# Patient Record
Sex: FEMALE | Race: WHITE | NOT HISPANIC OR LATINO | Employment: PART TIME | URBAN - METROPOLITAN AREA
[De-identification: names, ages, dates, MRNs, and addresses within clinical notes are randomized per-mention and may not be internally consistent; named-entity substitution may affect disease eponyms.]

---

## 2017-04-04 ENCOUNTER — OFFICE VISIT (OUTPATIENT)
Dept: UROLOGY | Facility: CLINIC | Age: 47
End: 2017-04-04
Payer: COMMERCIAL

## 2017-04-04 VITALS
BODY MASS INDEX: 17.33 KG/M2 | WEIGHT: 107.81 LBS | HEIGHT: 66 IN | SYSTOLIC BLOOD PRESSURE: 94 MMHG | DIASTOLIC BLOOD PRESSURE: 61 MMHG | HEART RATE: 61 BPM

## 2017-04-04 DIAGNOSIS — N39.0 RECURRENT UTI: Primary | ICD-10-CM

## 2017-04-04 DIAGNOSIS — R31.0 GROSS HEMATURIA: ICD-10-CM

## 2017-04-04 PROCEDURE — 99204 OFFICE O/P NEW MOD 45 MIN: CPT | Mod: 25,S$GLB,, | Performed by: UROLOGY

## 2017-04-04 PROCEDURE — 99999 PR PBB SHADOW E&M-EST. PATIENT-LVL III: CPT | Mod: PBBFAC,,, | Performed by: UROLOGY

## 2017-04-04 PROCEDURE — 1160F RVW MEDS BY RX/DR IN RCRD: CPT | Mod: S$GLB,,, | Performed by: UROLOGY

## 2017-04-04 PROCEDURE — 81001 URINALYSIS AUTO W/SCOPE: CPT | Mod: S$GLB,,, | Performed by: UROLOGY

## 2017-04-04 RX ORDER — NITROFURANTOIN MACROCRYSTALS 50 MG/1
50 CAPSULE ORAL DAILY
Qty: 30 CAPSULE | Refills: 12 | Status: SHIPPED | OUTPATIENT
Start: 2017-04-04 | End: 2017-05-04

## 2017-04-04 NOTE — PROGRESS NOTES
"Subjective:       Patient ID: Shantell Aguilar is a 46 y.o. female.    Chief Complaint: Urinary Tract Infection (recurrent for past year)    HPI Comments: Shantell Aguilar is a 46 y.o. Female referred for "recurrent UTI" every couple of months for last year.   Symptoms of UTI - dysuria, frequency, gross hematuria.   Just finished cipro Sunday.    Still have periods.   She thinks it is related to intercourse. Her  lives in [place].  She is a . He is an  for a school system (equivanlent of head start).    Symptoms improved after antibiotics.     Urinary frequency baseline every 2 hours. Nocturia x 1.   No stress incontinence.   Small amount of incontinence without sensory awareness.     No constipation.  No previous UTI.   No history of kidney stones.     No smoking history.   No chemical exposure.   She is a vegetarian. She eats a lot of yogurt.        Urinary Tract Infection    Associated symptoms include frequency. Pertinent negatives include no chills or rash.       History reviewed. No pertinent surgical history.    Past Medical History:   Diagnosis Date    Abnormal Pap smear of cervix 2000    dysplasia - Conization?    Frequent UTI        Social History     Social History    Marital status: Single     Spouse name: N/A    Number of children: N/A    Years of education: N/A     Occupational History    Not on file.     Social History Main Topics    Smoking status: Never Smoker    Smokeless tobacco: Not on file    Alcohol use No    Drug use: No    Sexual activity: Yes     Partners: Male     Birth control/ protection: None     Other Topics Concern    Not on file     Social History Narrative       Family History   Problem Relation Age of Onset    Cancer Maternal Grandmother     Breast cancer Neg Hx     Ovarian cancer Neg Hx        No current outpatient prescriptions on file.     No current facility-administered medications for this visit.        Review of patient's " allergies indicates:   Allergen Reactions    Penicillins        Review of Systems   Constitutional: Negative for chills, fever and unexpected weight change.   HENT: Negative for nosebleeds.    Eyes: Negative for visual disturbance.   Respiratory: Negative for chest tightness.    Cardiovascular: Negative for chest pain.   Gastrointestinal: Negative for diarrhea.   Genitourinary: Positive for frequency.   Musculoskeletal: Negative for myalgias.   Skin: Negative for rash.   Neurological: Negative for seizures.   Hematological: Does not bruise/bleed easily.   Psychiatric/Behavioral: Negative for behavioral problems.       Objective:      Physical Exam   Vitals reviewed.  Constitutional: She is oriented to person, place, and time. She appears well-developed and well-nourished.   HENT:   Head: Normocephalic and atraumatic.   Eyes: No scleral icterus.   Cardiovascular: Normal rate and regular rhythm.    Pulmonary/Chest: Effort normal. No respiratory distress.   Abdominal: She exhibits no mass.   Musculoskeletal: She exhibits no tenderness.   Lymphadenopathy:     She has no cervical adenopathy.   Neurological: She is alert and oriented to person, place, and time.   Skin: Skin is warm and dry. No rash noted.     Psychiatric: She has a normal mood and affect.     urine dip clear.   Assessment:       1. Recurrent UTI    2. Gross hematuria        Plan:     The patient will be set up for a hematuria workup to include a CT urogram, urine cytology, cystoscopy and urine culture.  A BMP will be ordered if not done in the last 60 days.  The risks and benefits of cystoscopy were discussed with the patient.   Pelvic at time of cysto.   Labs screening health.   Release of records from urgent care.   Probiotic daily. D-mannose/cranberry.

## 2017-04-11 ENCOUNTER — HOSPITAL ENCOUNTER (OUTPATIENT)
Dept: RADIOLOGY | Facility: OTHER | Age: 47
Discharge: HOME | End: 2017-04-11
Attending: UROLOGY
Payer: COMMERCIAL

## 2017-04-11 ENCOUNTER — TELEPHONE (OUTPATIENT)
Dept: UROLOGY | Facility: CLINIC | Age: 47
End: 2017-04-11

## 2017-04-11 DIAGNOSIS — N12 PYELONEPHRITIS: Primary | ICD-10-CM

## 2017-04-11 DIAGNOSIS — N39.0 RECURRENT UTI: ICD-10-CM

## 2017-04-11 DIAGNOSIS — R31.0 GROSS HEMATURIA: ICD-10-CM

## 2017-04-11 PROCEDURE — 74178 CT ABD&PLV WO CNTR FLWD CNTR: CPT | Mod: TC

## 2017-04-11 PROCEDURE — 25500020 PHARM REV CODE 255: Performed by: UROLOGY

## 2017-04-11 PROCEDURE — 74178 CT ABD&PLV WO CNTR FLWD CNTR: CPT | Mod: 26,,, | Performed by: RADIOLOGY

## 2017-04-11 RX ORDER — SULFAMETHOXAZOLE AND TRIMETHOPRIM 800; 160 MG/1; MG/1
1 TABLET ORAL 2 TIMES DAILY
Qty: 28 TABLET | Refills: 0 | Status: SHIPPED | OUTPATIENT
Start: 2017-04-11 | End: 2017-04-25

## 2017-04-11 RX ADMIN — IOHEXOL 125 ML: 350 INJECTION, SOLUTION INTRAVENOUS at 02:04

## 2017-04-12 ENCOUNTER — PATIENT MESSAGE (OUTPATIENT)
Dept: UROLOGY | Facility: CLINIC | Age: 47
End: 2017-04-12

## 2018-03-07 ENCOUNTER — TELEPHONE (OUTPATIENT)
Dept: OBSTETRICS AND GYNECOLOGY | Facility: CLINIC | Age: 48
End: 2018-03-07

## 2018-03-07 ENCOUNTER — HOSPITAL ENCOUNTER (EMERGENCY)
Facility: OTHER | Age: 48
Discharge: HOME | End: 2018-03-07
Attending: EMERGENCY MEDICINE

## 2018-03-07 VITALS
HEART RATE: 69 BPM | DIASTOLIC BLOOD PRESSURE: 67 MMHG | RESPIRATION RATE: 18 BRPM | TEMPERATURE: 98 F | SYSTOLIC BLOOD PRESSURE: 121 MMHG | WEIGHT: 110 LBS | BODY MASS INDEX: 17.68 KG/M2 | HEIGHT: 66 IN

## 2018-03-07 DIAGNOSIS — R10.9 ABDOMINAL PAIN, UNSPECIFIED ABDOMINAL LOCATION: Primary | ICD-10-CM

## 2018-03-07 DIAGNOSIS — K59.00 CONSTIPATION, UNSPECIFIED CONSTIPATION TYPE: ICD-10-CM

## 2018-03-07 PROCEDURE — 99283 EMERGENCY DEPT VISIT LOW MDM: CPT

## 2018-03-07 NOTE — TELEPHONE ENCOUNTER
Patient is requesting a colonoscopy to be ordered. Patient advised that she needs to contact her primary care provider. Patient stated that she does not have a primary care provider. Patient also stated that she was seen in the ER and assumed that the needed information would have been placed in the discharge summary. Advised patient that she needs to establish care with a primary care physician. Patient said thank you and hung up.

## 2018-03-07 NOTE — ED TRIAGE NOTES
"Pt presents to ER w/ reports of + generalized abdominal pains described as "constipated". Pt states," I am just ready to go. The doctor told me I am waiting on my paperwork".   "

## 2018-03-07 NOTE — ED NOTES
"Two patient identifiers have been checked and are correct.      Appearance: Pt awake, alert & oriented to person, place & time. Pt in no acute distress at present time. Pt is clean and well groomed with clothes appropriately fastened.   Skin: Skin warm, dry & intact. Color consistent with ethnicity. Mucous membranes moist. No breakdown or brusing noted.   Musculoskeletal: Patient moving all extremities well, no obvious swelling or deformities noted.   Respiratory: Respirations spontaneous, even, and non-labored. Visible chest rise noted. Airway is open and patent. No accessory muscle use noted.   Neurologic: Sensation is intact. Speech is clear and appropriate. Eyes open spontaneously, behavior appropriate to situation, follows commands, facial expression symmetrical, bilateral hand grasp equal and even, purposeful motor response noted.  Abdomen: Pt reports abdomen is soft, non-tender, pt refused assessment from nurse at this time. Pt reports + intermittent abdominal pains described as "constipation for a while now".   : Pt reports no dysuria or hematuria.   Pt states," I am ready to leave, I am just waiting on my paperwork now".           "

## 2018-03-07 NOTE — ED NOTES
Pt states she doesn't want to stay because she cannot afford the hospital bill and that it was a mistake for her to even check in. Informed pt we will see and treat pt regardless of ability to pay and that there is a financial assistance program available and someone could come talk to her about it. Pt states she is already late for work and does not want to wait to speak to financial aid. Instructed pt to come back if she does feel the need to be seen, pt verbalized understanding. Pt given information on community clinics.

## 2018-03-07 NOTE — ED PROVIDER NOTES
Encounter Date: 3/7/2018    SCRIBE #1 NOTE: I, Aimee Brasher, am scribing for, and in the presence of, Dr. June.       History     Chief Complaint   Patient presents with    Constipation     PT CO constipation, nausea, abd pain, and anorexia since friday.     Time seen by provider: 6:32 AM    This is a 47 y.o. female who presents with complaint of constipation that began five days ago. Patient reports intermittent abdominal pain, abdominal distension, nausea, decreased appetite, and chills. She describes abdominal pain as sharp and cramping sensation. She rates the pain as 2/10, and at the worst a 6/10. Pain worsens after eating. She denies vomiting, blood in stool, dysuria, urinary frequency, or urinary urgency. She reports one previous episode of similar symptoms several years ago. Patient reports her last bowel movement without using an enema was five days ago. She reports using four enemas in the last two days. Patient reports being seen at Urgent Care two days ago for the same complaint. She received an abdominal x-ray at that time that showed no obstruction. She denies any previous abdominal surgeries. She denies tobacco, alcohol, or drug use.       The history is provided by the patient.     Review of patient's allergies indicates:   Allergen Reactions    Penicillins      Past Medical History:   Diagnosis Date    Abnormal Pap smear of cervix 2000    dysplasia - Conization?    Frequent UTI      History reviewed. No pertinent surgical history.  Family History   Problem Relation Age of Onset    Cancer Maternal Grandmother     Breast cancer Neg Hx     Ovarian cancer Neg Hx      Social History   Substance Use Topics    Smoking status: Never Smoker    Smokeless tobacco: Never Used    Alcohol use No     Review of Systems   Constitutional: Positive for appetite change (decreased) and chills. Negative for fever.   HENT: Negative for congestion and sore throat.    Eyes: Negative for visual disturbance.    Respiratory: Negative for cough and shortness of breath.    Cardiovascular: Negative for chest pain and palpitations.   Gastrointestinal: Positive for abdominal distention, abdominal pain, constipation and nausea. Negative for blood in stool, diarrhea and vomiting.   Genitourinary: Negative for decreased urine volume, dysuria, frequency, urgency and vaginal discharge.   Musculoskeletal: Negative for joint swelling, neck pain and neck stiffness.   Skin: Negative for rash and wound.   Neurological: Negative for weakness, numbness and headaches.   Psychiatric/Behavioral: Negative for confusion.       Physical Exam     Initial Vitals [03/07/18 0621]   BP Pulse Resp Temp SpO2   121/67 69 18 97.7 °F (36.5 °C) --      MAP       85         Physical Exam    Nursing note and vitals reviewed.  Constitutional: She appears well-developed and well-nourished. She is not diaphoretic. No distress.   HENT:   Head: Normocephalic and atraumatic.   Right Ear: External ear normal.   Left Ear: External ear normal.   Eyes: EOM are normal. Right eye exhibits no discharge. Left eye exhibits no discharge.   Neck: Normal range of motion.   Cardiovascular: Normal rate, regular rhythm and normal heart sounds. Exam reveals no gallop and no friction rub.    No murmur heard.  Pulmonary/Chest: Breath sounds normal. No respiratory distress. She has no wheezes. She has no rhonchi. She has no rales.   Abdominal: Soft. There is no tenderness. There is no rebound and no guarding.   Musculoskeletal: Normal range of motion. She exhibits no edema or tenderness.   Neurological: She is alert and oriented to person, place, and time.   Skin: Skin is warm and dry. No rash and no abscess noted. No erythema. No pallor.   Psychiatric: She has a normal mood and affect. Her behavior is normal. Judgment and thought content normal.         ED Course   Procedures  Labs Reviewed - No data to display          Medical Decision Making:   ED Management:  Emergent  evaluation a 47-year-old female with complaint of constipation.  Patient reports using multiple enemas recently.  On exam she has mild diffuse abdominal tenderness, but a benign belly, nonsurgical.  She is advised to increase fiber in the diet, close follow-up with PCP, return for any new or worsening symptoms.  I'm comfortable with discharge home.              Attending Attestation:           Physician Attestation for Scribe:  Physician Attestation Statement for Scribe #1: I, Dr. June, reviewed documentation, as scribed by Aimee Brasher in my presence, and it is both accurate and complete.                    Clinical Impression:     1. Abdominal pain, unspecified abdominal location    2. Constipation, unspecified constipation type                               Jeni June MD  03/11/18 6990

## 2018-03-07 NOTE — TELEPHONE ENCOUNTER
----- Message from Sherry Stevenson sent at 3/7/2018 12:47 PM CST -----  Contact: Pt can be reached at [phone]  Pt is calling to request orders for colonoscopy please.    Pt has no insurance.    Thank you!

## 2019-04-05 ENCOUNTER — HOSPITAL ENCOUNTER (EMERGENCY)
Facility: OTHER | Age: 49
Discharge: HOME | End: 2019-04-05
Attending: EMERGENCY MEDICINE
Payer: COMMERCIAL

## 2019-04-05 VITALS
HEIGHT: 66 IN | WEIGHT: 110 LBS | TEMPERATURE: 98 F | RESPIRATION RATE: 18 BRPM | BODY MASS INDEX: 17.68 KG/M2 | SYSTOLIC BLOOD PRESSURE: 107 MMHG | DIASTOLIC BLOOD PRESSURE: 65 MMHG | OXYGEN SATURATION: 100 % | HEART RATE: 62 BPM

## 2019-04-05 DIAGNOSIS — S05.01XA ABRASION OF RIGHT CORNEA, INITIAL ENCOUNTER: Primary | ICD-10-CM

## 2019-04-05 PROCEDURE — 99284 EMERGENCY DEPT VISIT MOD MDM: CPT | Mod: 25

## 2019-04-05 PROCEDURE — 90715 TDAP VACCINE 7 YRS/> IM: CPT | Performed by: EMERGENCY MEDICINE

## 2019-04-05 PROCEDURE — 90471 IMMUNIZATION ADMIN: CPT | Performed by: EMERGENCY MEDICINE

## 2019-04-05 PROCEDURE — 25000003 PHARM REV CODE 250: Performed by: EMERGENCY MEDICINE

## 2019-04-05 PROCEDURE — 63600175 PHARM REV CODE 636 W HCPCS: Performed by: EMERGENCY MEDICINE

## 2019-04-05 RX ORDER — PROPARACAINE HYDROCHLORIDE 5 MG/ML
1 SOLUTION/ DROPS OPHTHALMIC
Status: COMPLETED | OUTPATIENT
Start: 2019-04-05 | End: 2019-04-05

## 2019-04-05 RX ORDER — ERYTHROMYCIN 5 MG/G
OINTMENT OPHTHALMIC
Qty: 1 TUBE | Refills: 0 | Status: SHIPPED | OUTPATIENT
Start: 2019-04-05 | End: 2021-12-11

## 2019-04-05 RX ORDER — ERYTHROMYCIN 5 MG/G
OINTMENT OPHTHALMIC
Status: COMPLETED | OUTPATIENT
Start: 2019-04-05 | End: 2019-04-05

## 2019-04-05 RX ADMIN — PROPARACAINE HYDROCHLORIDE 1 DROP: 5 SOLUTION/ DROPS OPHTHALMIC at 09:04

## 2019-04-05 RX ADMIN — CLOSTRIDIUM TETANI TOXOID ANTIGEN (FORMALDEHYDE INACTIVATED), CORYNEBACTERIUM DIPHTHERIAE TOXOID ANTIGEN (FORMALDEHYDE INACTIVATED), BORDETELLA PERTUSSIS TOXOID ANTIGEN (GLUTARALDEHYDE INACTIVATED), BORDETELLA PERTUSSIS FILAMENTOUS HEMAGGLUTININ ANTIGEN (FORMALDEHYDE INACTIVATED), BORDETELLA PERTUSSIS PERTACTIN ANTIGEN, AND BORDETELLA PERTUSSIS FIMBRIAE 2/3 ANTIGEN 0.5 ML: 5; 2; 2.5; 5; 3; 5 INJECTION, SUSPENSION INTRAMUSCULAR at 09:04

## 2019-04-05 RX ADMIN — ERYTHROMYCIN 1 INCH: 5 OINTMENT OPHTHALMIC at 09:04

## 2019-04-05 RX ADMIN — FLUORESCEIN SODIUM 1 EACH: 1 STRIP OPHTHALMIC at 09:04

## 2019-04-06 NOTE — ED PROVIDER NOTES
"Encounter Date: 4/5/2019    SCRIBE #1 NOTE: I, Colby Lira, am scribing for, and in the presence of, Dr. Valle.       History     Chief Complaint   Patient presents with    Eye Injury     Leaned over to grab something and with her open right eye, possible stick stuck her eye.      Time seen by provider: 9:20 PM    This is a 48 y.o. female who presents with complaint of eye injury that happened a few hours ago. The patient reports that she was at work, when she bent down and picked something up. She reports that she didn't have glasses on and felt something poke her in the R eye. She doesn't know what it was that poked her in the eye. The patient reports that she is experiencing photophobia, right eye pain, mild blurred vision to the right eye, and "watering" of the right eye. The patient reports that she took three ibuprofen with no relief of her symptoms. She denies fever, sore throat, chest pain, shortness of breath, nausea, and dysuria.     The history is provided by the patient.     Review of patient's allergies indicates:   Allergen Reactions    Demerol [meperidine] Other (See Comments)     "I had it before a sedation and I didn't wake up when I was supposed to.  I was overly sedated."    Penicillins      Past Medical History:   Diagnosis Date    Abnormal Pap smear of cervix 2000    dysplasia - Conization?    Foot fracture, left     "stress fracture"    Frequent UTI      History reviewed. No pertinent surgical history.  Family History   Problem Relation Age of Onset    Cancer Maternal Grandmother     Breast cancer Neg Hx     Ovarian cancer Neg Hx      Social History     Tobacco Use    Smoking status: Never Smoker    Smokeless tobacco: Never Used   Substance Use Topics    Alcohol use: No    Drug use: No     Review of Systems   Constitutional: Negative for fever.   HENT: Negative for sore throat.    Eyes: Positive for photophobia, pain and visual disturbance (mild blurred vision).        " "Positive for "watering" to the right eye.   Respiratory: Negative for shortness of breath.    Cardiovascular: Negative for chest pain.   Gastrointestinal: Negative for nausea.   Genitourinary: Negative for dysuria.   Musculoskeletal: Negative for back pain.   Skin: Negative for rash.   Neurological: Negative for weakness.   Hematological: Does not bruise/bleed easily.       Physical Exam     Initial Vitals [04/05/19 2045]   BP Pulse Resp Temp SpO2   127/77 (!) 58 19 97.7 °F (36.5 °C) 100 %      MAP       --         Physical Exam    Vitals reviewed.  Constitutional: She appears well-developed and well-nourished. She is not diaphoretic. No distress.   HENT:   Head: Normocephalic and atraumatic.   Mouth/Throat: Oropharynx is clear and moist.   Eyes: EOM are normal. Pupils are equal, round, and reactive to light.   Slit lamp exam:       The right eye shows fluorescein uptake.   Right diffuse mild conjunctival erythema. Fluorscein with large horizontal area of uptake over superior iris c/w  abrasion extending medially. Two smaller circular areas of uptake over the mid-iris. No sign of open globe or FB   Neck: Normal range of motion. Neck supple.   Cardiovascular: Normal rate, regular rhythm, normal heart sounds and normal pulses. Exam reveals no gallop and no friction rub.    No murmur heard.  Pulmonary/Chest: Breath sounds normal. No respiratory distress. She has no wheezes. She has no rhonchi. She has no rales.   Abdominal: Soft. There is no tenderness. There is no rebound and no guarding.   Musculoskeletal: Normal range of motion. She exhibits no edema or tenderness.   Neurological: She is alert and oriented to person, place, and time. She has normal strength. No cranial nerve deficit.   Skin: Skin is warm and dry.   Psychiatric: She has a normal mood and affect. Her behavior is normal. Thought content normal.         ED Course   Procedures  Labs Reviewed - No data to display       Imaging Results    None        "   Medical Decision Making:   Initial Assessment:       Healthy 48-year-old female presents s/p right eye injury few hours ago.  She was leaning over to pick something up and unknown object hit her right eye, with immediate pain and tearing.  She has had significant photophobia and right eye pain since then, not improved by OTC ibuprofen.  She does not wear contacts or glasses at baseline, and was at normal baseline prior.     On exam she has mild diffuse conjunctival erythema right eye with full EOMI and no sign of corneal laceration or retained foreign body on external exam.  After topical proparacaine, patient's pain and photophobia much improved.  Fluorescein stain concerning for multiple corneal abrasions, largest one over superior iris in a horizontal orientation extending medially, and 2 smaller circular abrasions over mid and lower iris.  No sign of open globe or retained foreign body.     Tetanus updated and she was started on erythromycin ointment for infection prevention.  Large corneal abrasion and location may cause significant pain and photophobia when proparacaine wears off, the patient does not want narcotics due to previous side effects.  She was given proparacaine to take home, only to use 1-2 times overnight in case of severe pain not relieved by NSAIDs.  Had extensive discussion with her about recent literature showing no adverse effect to limited use in 1st 24 hr, but still concern for corneal ulcer development or damage if used more than that.  She will closely follow up with Ophthalmology within 2-3 days for reassessment. Patient comfortable discharge plan and will return to the ED for any worsening eye pain or other concerns.              Scribe Attestation:   Scribe #1: I performed the above scribed service and the documentation accurately describes the services I performed. I attest to the accuracy of the note.    Attending Attestation:           Physician Attestation for Scribe:  Physician  Attestation Statement for Scribe #1: I, Dr. Valle, reviewed documentation, as scribed by Colby Lira in my presence, and it is both accurate and complete.                    Clinical Impression:     1. Abrasion of right cornea, initial encounter                                   Arturo Valle MD  04/08/19 0708

## 2019-04-06 NOTE — ED TRIAGE NOTES
"Pt states she was leaning over to reach for something and "something stabbed into my R eye.  I don't even know what it was."  +photophobia.  Had Ibuprofen about 45 minutes ago.  Pink sclera noted to R eye.  Reports some blurred vision.  Pain  4/10.  "

## 2019-04-08 ENCOUNTER — OFFICE VISIT (OUTPATIENT)
Dept: OPTOMETRY | Facility: CLINIC | Age: 49
End: 2019-04-08
Payer: COMMERCIAL

## 2019-04-08 DIAGNOSIS — S05.01XD ABRASION OF RIGHT CORNEA, SUBSEQUENT ENCOUNTER: Primary | ICD-10-CM

## 2019-04-08 PROCEDURE — 99999 PR PBB SHADOW E&M-EST. PATIENT-LVL II: CPT | Mod: PBBFAC,,, | Performed by: OPTOMETRIST

## 2019-04-08 PROCEDURE — 92002 INTRM OPH EXAM NEW PATIENT: CPT | Mod: S$GLB,,, | Performed by: OPTOMETRIST

## 2019-04-08 PROCEDURE — 99999 PR PBB SHADOW E&M-EST. PATIENT-LVL II: ICD-10-PCS | Mod: PBBFAC,,, | Performed by: OPTOMETRIST

## 2019-04-08 PROCEDURE — 92002 PR EYE EXAM, NEW PATIENT,INTERMED: ICD-10-PCS | Mod: S$GLB,,, | Performed by: OPTOMETRIST

## 2019-04-08 NOTE — PROGRESS NOTES
HPI     Pt says she was bending over to  a cup Friday night and something   hit her in OD, she is not sure what. She says she is having pain and   sensitive to light. She says it has gotten a lot better over the weekend.   She was having some tearing and burning but not so much anymore. She still   is experiencing foreign body sensation and irritation. Pt was given   Proparacaine after visiting [facility] on Friday. She has been using it   about once a day OD.   Pt has been using erythromycin QID OD.  She says they help briefly. Pt   says her vision OD is blurry.     Last edited by Mary Armas on 4/8/2019  1:01 PM. (History)            Assessment /Plan     For exam results, see Encounter Report.    Abrasion of right cornea, subsequent encounter      Continue with emycin charity QHS x 1 week  Cycloplege today OD  Do not use proparacaine drops  Return if condition worsens or does not improve

## 2021-12-11 ENCOUNTER — OFFICE VISIT (OUTPATIENT)
Dept: URGENT CARE | Facility: CLINIC | Age: 51
End: 2021-12-11
Payer: COMMERCIAL

## 2021-12-11 VITALS
HEIGHT: 66 IN | SYSTOLIC BLOOD PRESSURE: 108 MMHG | OXYGEN SATURATION: 96 % | BODY MASS INDEX: 17.68 KG/M2 | WEIGHT: 110 LBS | DIASTOLIC BLOOD PRESSURE: 69 MMHG | HEART RATE: 69 BPM | TEMPERATURE: 98 F

## 2021-12-11 DIAGNOSIS — M25.511 ACUTE PAIN OF RIGHT SHOULDER: ICD-10-CM

## 2021-12-11 DIAGNOSIS — S46.001A INJURY OF RIGHT ROTATOR CUFF, INITIAL ENCOUNTER: Primary | ICD-10-CM

## 2021-12-11 PROCEDURE — 73030 XR SHOULDER COMPLETE 2 OR MORE VIEWS RIGHT: ICD-10-PCS | Mod: FY,RT,S$GLB, | Performed by: RADIOLOGY

## 2021-12-11 PROCEDURE — 73030 X-RAY EXAM OF SHOULDER: CPT | Mod: FY,RT,S$GLB, | Performed by: RADIOLOGY

## 2021-12-11 PROCEDURE — 99203 OFFICE O/P NEW LOW 30 MIN: CPT | Mod: S$GLB,,, | Performed by: PHYSICIAN ASSISTANT

## 2021-12-11 PROCEDURE — 99203 PR OFFICE/OUTPT VISIT, NEW, LEVL III, 30-44 MIN: ICD-10-PCS | Mod: S$GLB,,, | Performed by: PHYSICIAN ASSISTANT

## 2021-12-11 RX ORDER — DICLOFENAC SODIUM 75 MG/1
75 TABLET, DELAYED RELEASE ORAL 2 TIMES DAILY PRN
Qty: 14 TABLET | Refills: 0 | Status: SHIPPED | OUTPATIENT
Start: 2021-12-11 | End: 2021-12-18

## 2021-12-13 ENCOUNTER — TELEPHONE (OUTPATIENT)
Dept: SPORTS MEDICINE | Facility: CLINIC | Age: 51
End: 2021-12-13
Payer: COMMERCIAL

## 2021-12-13 ENCOUNTER — TELEPHONE (OUTPATIENT)
Dept: ORTHOPEDICS | Facility: CLINIC | Age: 51
End: 2021-12-13
Payer: COMMERCIAL

## 2021-12-21 ENCOUNTER — OFFICE VISIT (OUTPATIENT)
Dept: SPORTS MEDICINE | Facility: CLINIC | Age: 51
End: 2021-12-21
Payer: COMMERCIAL

## 2021-12-21 ENCOUNTER — CLINICAL SUPPORT (OUTPATIENT)
Dept: REHABILITATION | Facility: OTHER | Age: 51
End: 2021-12-21
Payer: COMMERCIAL

## 2021-12-21 VITALS
BODY MASS INDEX: 17.36 KG/M2 | SYSTOLIC BLOOD PRESSURE: 86 MMHG | DIASTOLIC BLOOD PRESSURE: 64 MMHG | HEIGHT: 66 IN | WEIGHT: 108 LBS | HEART RATE: 55 BPM

## 2021-12-21 DIAGNOSIS — R29.3 POSTURE IMBALANCE: ICD-10-CM

## 2021-12-21 DIAGNOSIS — M50.90 CERVICAL DISC DISORDER: ICD-10-CM

## 2021-12-21 DIAGNOSIS — M25.511 RIGHT SHOULDER PAIN, UNSPECIFIED CHRONICITY: ICD-10-CM

## 2021-12-21 DIAGNOSIS — M54.2 NECK PAIN: ICD-10-CM

## 2021-12-21 DIAGNOSIS — M54.12 CERVICAL RADICULAR PAIN: Primary | ICD-10-CM

## 2021-12-21 PROCEDURE — 99204 OFFICE O/P NEW MOD 45 MIN: CPT | Mod: S$GLB,,, | Performed by: ORTHOPAEDIC SURGERY

## 2021-12-21 PROCEDURE — 97162 PT EVAL MOD COMPLEX 30 MIN: CPT | Mod: PN | Performed by: PHYSICAL THERAPIST

## 2021-12-21 PROCEDURE — 97110 THERAPEUTIC EXERCISES: CPT | Mod: PN | Performed by: PHYSICAL THERAPIST

## 2021-12-21 PROCEDURE — 76881 US COMPL JOINT R-T W/IMG: CPT | Mod: S$GLB,,, | Performed by: ORTHOPAEDIC SURGERY

## 2021-12-21 PROCEDURE — 76881 PR US EXTREMITY OR AXILLA, TISSUE/MUSCLE/JOINT/NERVE; COMPLETE: ICD-10-PCS | Mod: S$GLB,,, | Performed by: ORTHOPAEDIC SURGERY

## 2021-12-21 PROCEDURE — 99204 PR OFFICE/OUTPT VISIT, NEW, LEVL IV, 45-59 MIN: ICD-10-PCS | Mod: S$GLB,,, | Performed by: ORTHOPAEDIC SURGERY

## 2021-12-21 PROCEDURE — 99999 PR PBB SHADOW E&M-EST. PATIENT-LVL III: ICD-10-PCS | Mod: PBBFAC,,, | Performed by: ORTHOPAEDIC SURGERY

## 2021-12-21 PROCEDURE — 99999 PR PBB SHADOW E&M-EST. PATIENT-LVL III: CPT | Mod: PBBFAC,,, | Performed by: ORTHOPAEDIC SURGERY

## 2021-12-21 RX ORDER — MELOXICAM 15 MG/1
15 TABLET ORAL DAILY
Qty: 30 TABLET | Refills: 0 | Status: SHIPPED | OUTPATIENT
Start: 2021-12-21 | End: 2022-07-12

## 2022-01-04 ENCOUNTER — PATIENT MESSAGE (OUTPATIENT)
Dept: REHABILITATION | Facility: OTHER | Age: 52
End: 2022-01-04

## 2022-01-04 ENCOUNTER — DOCUMENTATION ONLY (OUTPATIENT)
Dept: REHABILITATION | Facility: OTHER | Age: 52
End: 2022-01-04
Payer: COMMERCIAL

## 2022-01-04 NOTE — PROGRESS NOTES
Physical Therapy Treatment Note    Patient was scheduled for physical therapy at [facility] for 1/4/2022. Pt failed to appear for appointment without prior notification for today.     Pt will need to contact the clinic for future appointments.    Thank you for your referral.    Sincerely,    Jordana Leone, PT

## 2022-06-21 ENCOUNTER — TELEPHONE (OUTPATIENT)
Dept: OBSTETRICS AND GYNECOLOGY | Facility: CLINIC | Age: 52
End: 2022-06-21
Payer: COMMERCIAL

## 2022-07-12 ENCOUNTER — OFFICE VISIT (OUTPATIENT)
Dept: OBSTETRICS AND GYNECOLOGY | Facility: CLINIC | Age: 52
End: 2022-07-12
Attending: OBSTETRICS & GYNECOLOGY
Payer: COMMERCIAL

## 2022-07-12 VITALS
DIASTOLIC BLOOD PRESSURE: 62 MMHG | WEIGHT: 107.38 LBS | SYSTOLIC BLOOD PRESSURE: 100 MMHG | HEIGHT: 66 IN | BODY MASS INDEX: 17.26 KG/M2

## 2022-07-12 DIAGNOSIS — Z12.4 PAP SMEAR FOR CERVICAL CANCER SCREENING: ICD-10-CM

## 2022-07-12 DIAGNOSIS — Z12.31 VISIT FOR SCREENING MAMMOGRAM: ICD-10-CM

## 2022-07-12 DIAGNOSIS — Z78.0 POSTMENOPAUSAL STATUS: ICD-10-CM

## 2022-07-12 DIAGNOSIS — Z01.419 WOMEN'S ANNUAL ROUTINE GYNECOLOGICAL EXAMINATION: Primary | ICD-10-CM

## 2022-07-12 PROCEDURE — 88175 CYTOPATH C/V AUTO FLUID REDO: CPT | Performed by: OBSTETRICS & GYNECOLOGY

## 2022-07-12 PROCEDURE — 3008F PR BODY MASS INDEX (BMI) DOCUMENTED: ICD-10-PCS | Mod: CPTII,S$GLB,, | Performed by: OBSTETRICS & GYNECOLOGY

## 2022-07-12 PROCEDURE — 1159F MED LIST DOCD IN RCRD: CPT | Mod: CPTII,S$GLB,, | Performed by: OBSTETRICS & GYNECOLOGY

## 2022-07-12 PROCEDURE — 1159F PR MEDICATION LIST DOCUMENTED IN MEDICAL RECORD: ICD-10-PCS | Mod: CPTII,S$GLB,, | Performed by: OBSTETRICS & GYNECOLOGY

## 2022-07-12 PROCEDURE — 99386 PREV VISIT NEW AGE 40-64: CPT | Mod: S$GLB,,, | Performed by: OBSTETRICS & GYNECOLOGY

## 2022-07-12 PROCEDURE — 3074F PR MOST RECENT SYSTOLIC BLOOD PRESSURE < 130 MM HG: ICD-10-PCS | Mod: CPTII,S$GLB,, | Performed by: OBSTETRICS & GYNECOLOGY

## 2022-07-12 PROCEDURE — 3078F DIAST BP <80 MM HG: CPT | Mod: CPTII,S$GLB,, | Performed by: OBSTETRICS & GYNECOLOGY

## 2022-07-12 PROCEDURE — 99999 PR PBB SHADOW E&M-EST. PATIENT-LVL III: ICD-10-PCS | Mod: PBBFAC,,, | Performed by: OBSTETRICS & GYNECOLOGY

## 2022-07-12 PROCEDURE — 87624 HPV HI-RISK TYP POOLED RSLT: CPT | Performed by: OBSTETRICS & GYNECOLOGY

## 2022-07-12 PROCEDURE — 1160F PR REVIEW ALL MEDS BY PRESCRIBER/CLIN PHARMACIST DOCUMENTED: ICD-10-PCS | Mod: CPTII,S$GLB,, | Performed by: OBSTETRICS & GYNECOLOGY

## 2022-07-12 PROCEDURE — 1160F RVW MEDS BY RX/DR IN RCRD: CPT | Mod: CPTII,S$GLB,, | Performed by: OBSTETRICS & GYNECOLOGY

## 2022-07-12 PROCEDURE — 3074F SYST BP LT 130 MM HG: CPT | Mod: CPTII,S$GLB,, | Performed by: OBSTETRICS & GYNECOLOGY

## 2022-07-12 PROCEDURE — 99999 PR PBB SHADOW E&M-EST. PATIENT-LVL III: CPT | Mod: PBBFAC,,, | Performed by: OBSTETRICS & GYNECOLOGY

## 2022-07-12 PROCEDURE — 99386 PR PREVENTIVE VISIT,NEW,40-64: ICD-10-PCS | Mod: S$GLB,,, | Performed by: OBSTETRICS & GYNECOLOGY

## 2022-07-12 PROCEDURE — 3008F BODY MASS INDEX DOCD: CPT | Mod: CPTII,S$GLB,, | Performed by: OBSTETRICS & GYNECOLOGY

## 2022-07-12 PROCEDURE — 3078F PR MOST RECENT DIASTOLIC BLOOD PRESSURE < 80 MM HG: ICD-10-PCS | Mod: CPTII,S$GLB,, | Performed by: OBSTETRICS & GYNECOLOGY

## 2022-07-12 NOTE — PROGRESS NOTES
"Shantell Aguilar is a 51 y.o. year old  female who presents for routine GYN exam.  She is postmenopausal, not on hormones.  Reports that her last period was several years ago.  She describes having hot flashes and sweats which seem to be somewhat improving.  S/P LEEP 2000 with normal subsequent paps.      Blood pressure 100/62, height 5' 6" (1.676 m), weight 48.7 kg (107 lb 5.8 oz), last menstrual period 07/15/2016.        Past Medical History:   Diagnosis Date    Abnormal Pap smear of cervix     dysplasia - Conization?    Foot fracture, left     "stress fracture"    Frequent UTI        Past Surgical History:   Procedure Laterality Date    CERVICAL BIOPSY  W/ LOOP ELECTRODE EXCISION         OB History        1    Para        Term                AB   1    Living           SAB   1    IAB        Ectopic        Multiple        Live Births                       ROS:  GENERAL: Feeling well overall.   SKIN: Denies rash or lesions.   HEAD: Denies head injury or headache.   NODES: Denies enlarged lymph nodes.   CHEST: Denies chest pain or shortness of breath.   CARDIOVASCULAR: Denies palpitations or left sided chest pain.   ABDOMEN: No abdominal pain, nausea, vomiting or rectal bleeding.   URINARY: No dysuria or hematuria.  REPRODUCTIVE: See HPI.   BREASTS: Denies pain, lumps, or nipple discharge.   HEMATOLOGIC: No easy bruisability or excessive bleeding.   MUSCULOSKELETAL: Denies joint pain.  NEUROLOGIC: Denies syncope or weakness.   PSYCHIATRIC: Denies depression.     PE:   (chaperone present during entire exam)  APPEARANCE: Well nourished, well developed, in no acute distress.  BREASTS: Symmetrical, no skin changes or visible lesions. No palpable masses, nipple discharge or adenopathy bilaterally.  ABDOMEN: Soft. No tenderness or masses. No hernias. No CVA tenderness.  VULVA: No lesions. Normal female genitalia.  URETHRAL MEATUS: Normal size and location, no lesions, no prolapse.  URETHRA: " "No masses, tenderness, prolapse or scarring.  VAGINA: No lesions, no abnormal discharge, no significant cystocele or rectocele.  CERVIX: No lesions and discharge. Stenotic. PAP done.  UTERUS: Normal size, regular shape, mobile, non-tender, bladder base nontender.  ADNEXA: No masses, tenderness or CDS nodularity.  ANUS PERINEUM: Normal.    Diagnosis:  1. Women's annual routine gynecological examination    2. Postmenopausal status    3. Pap smear for cervical cancer screening    4. Visit for screening mammogram          PLAN:    Orders Placed This Encounter    HPV High Risk Genotypes, PCR    Mammo Digital Screening Bilat w/ Andrzej    Liquid-Based Pap Smear, Screening       Patient was counseled today on postmenopausal issues.  We discussed treatment options for menopausal symptoms: 1) no treatment  2) non-hormonal options- Brisdelle  3) " natural" hormones  4) HRT: risks / benefits / studies, [facility].  She plans to try Black Cohosh and will let us know her progress.      Follow-up in 1 year.    Answers for HPI/ROS submitted by the patient on 2022  genital itching: No  genital lesions: No  genital odor: No  genital rash: No  missed menses: No  pelvic pain: No  vaginal bleeding: No  vaginal discharge: Yes  Chronicity: recurrent  Onset: more than 1 month ago  Frequency: daily  Pain severity: no pain  Affected side: both  Pregnant now?: No  abdominal pain: No  anorexia: No  chills: No  constipation: No  discolored urine: No  dysuria: No  flank pain: No  frequency: No  hematuria: No  nausea: No  rash: No  urgency: No  Please select the characteristics of your discharge: : yellow  Vaginal bleeding: no bleeding  Passing clots?: No  Passing tissue?: No  Aggravated by: nothing  treatments tried: nothing  Improvement on treatment: no relief  Sexual activity: not sexually active  Partner with STD symptoms: no  Birth control: nothing  Menstrual history: postmenopausal  STD: No  abdominal surgery: No   section: " No  Ectopic pregnancy: No  Endometriosis: No  herpes simplex: No  gynecological surgery: Yes  menorrhagia: No  metrorrhagia: No  miscarriage: No  ovarian cysts: No  perineal abscess: No  PID: No  terminated pregnancy: No  vaginosis: No

## 2022-07-20 LAB
FINAL PATHOLOGIC DIAGNOSIS: NORMAL
Lab: NORMAL

## 2022-07-21 ENCOUNTER — PATIENT MESSAGE (OUTPATIENT)
Dept: OBSTETRICS AND GYNECOLOGY | Facility: CLINIC | Age: 52
End: 2022-07-21
Payer: COMMERCIAL

## 2022-07-22 ENCOUNTER — TELEPHONE (OUTPATIENT)
Dept: OBSTETRICS AND GYNECOLOGY | Facility: CLINIC | Age: 52
End: 2022-07-22
Payer: COMMERCIAL

## 2022-07-22 LAB
HPV HR 12 DNA SPEC QL NAA+PROBE: POSITIVE
HPV16 AG SPEC QL: NEGATIVE
HPV18 DNA SPEC QL NAA+PROBE: NEGATIVE

## 2022-07-22 NOTE — TELEPHONE ENCOUNTER
----- Message from Giselle Gillespie sent at 7/22/2022  3:24 PM CDT -----  Regarding: missed call       Who Called: Shantell         Who Left Message for Patient: doctor         Does the patient know what this is regarding? No         Best Call Back Number:[phone]         Additional Information:

## 2022-07-22 NOTE — TELEPHONE ENCOUNTER
Called patient:    Message left to call us.    [Pap with normal cytology, other HR HPV present.  REC: Repeat pap / HPV in one year]

## 2022-07-26 ENCOUNTER — TELEPHONE (OUTPATIENT)
Dept: OBSTETRICS AND GYNECOLOGY | Facility: CLINIC | Age: 52
End: 2022-07-26
Payer: COMMERCIAL

## 2022-07-26 NOTE — TELEPHONE ENCOUNTER
----- Message from Shana Bui sent at 7/26/2022  8:58 AM CDT -----  Pt is returning a important missed call from Dr Shea. Pt can be reached at [phone]

## 2022-07-27 NOTE — TELEPHONE ENCOUNTER
Called patient:    Discussed results of pap:  Cytology negative, other HR HPV present.    REC:  Repeat pap / HPV screening in one year - I emphasized the importance of follow-up.

## 2022-08-23 ENCOUNTER — HOSPITAL ENCOUNTER (EMERGENCY)
Facility: OTHER | Age: 52
Discharge: HOME | End: 2022-08-23
Attending: EMERGENCY MEDICINE
Payer: COMMERCIAL

## 2022-08-23 VITALS
WEIGHT: 110 LBS | HEART RATE: 71 BPM | DIASTOLIC BLOOD PRESSURE: 61 MMHG | BODY MASS INDEX: 17.27 KG/M2 | TEMPERATURE: 97 F | HEIGHT: 67 IN | SYSTOLIC BLOOD PRESSURE: 115 MMHG | RESPIRATION RATE: 17 BRPM | OXYGEN SATURATION: 99 %

## 2022-08-23 DIAGNOSIS — R42 DIZZINESS: ICD-10-CM

## 2022-08-23 DIAGNOSIS — H81.10 BENIGN PAROXYSMAL POSITIONAL VERTIGO, UNSPECIFIED LATERALITY: Primary | ICD-10-CM

## 2022-08-23 LAB
ALBUMIN SERPL BCP-MCNC: 4.3 G/DL (ref 3.5–5.2)
ALP SERPL-CCNC: 76 U/L (ref 55–135)
ALT SERPL W/O P-5'-P-CCNC: 28 U/L (ref 10–44)
ANION GAP SERPL CALC-SCNC: 11 MMOL/L (ref 8–16)
AST SERPL-CCNC: 26 U/L (ref 10–40)
BASOPHILS # BLD AUTO: 0.03 K/UL (ref 0–0.2)
BASOPHILS NFR BLD: 0.4 % (ref 0–1.9)
BILIRUB SERPL-MCNC: 0.5 MG/DL (ref 0.1–1)
BUN SERPL-MCNC: 14 MG/DL (ref 6–20)
CALCIUM SERPL-MCNC: 9.7 MG/DL (ref 8.7–10.5)
CHLORIDE SERPL-SCNC: 105 MMOL/L (ref 95–110)
CO2 SERPL-SCNC: 25 MMOL/L (ref 23–29)
CREAT SERPL-MCNC: 0.7 MG/DL (ref 0.5–1.4)
DIFFERENTIAL METHOD: ABNORMAL
EOSINOPHIL # BLD AUTO: 0 K/UL (ref 0–0.5)
EOSINOPHIL NFR BLD: 0 % (ref 0–8)
ERYTHROCYTE [DISTWIDTH] IN BLOOD BY AUTOMATED COUNT: 12.7 % (ref 11.5–14.5)
EST. GFR  (NO RACE VARIABLE): >60 ML/MIN/1.73 M^2
GLUCOSE SERPL-MCNC: 97 MG/DL (ref 70–110)
HCT VFR BLD AUTO: 41 % (ref 37–48.5)
HGB BLD-MCNC: 13.5 G/DL (ref 12–16)
IMM GRANULOCYTES # BLD AUTO: 0.01 K/UL (ref 0–0.04)
IMM GRANULOCYTES NFR BLD AUTO: 0.1 % (ref 0–0.5)
LYMPHOCYTES # BLD AUTO: 0.5 K/UL (ref 1–4.8)
LYMPHOCYTES NFR BLD: 5.8 % (ref 18–48)
MCH RBC QN AUTO: 31.3 PG (ref 27–31)
MCHC RBC AUTO-ENTMCNC: 32.9 G/DL (ref 32–36)
MCV RBC AUTO: 95 FL (ref 82–98)
MONOCYTES # BLD AUTO: 0.2 K/UL (ref 0.3–1)
MONOCYTES NFR BLD: 2.2 % (ref 4–15)
NEUTROPHILS # BLD AUTO: 7.4 K/UL (ref 1.8–7.7)
NEUTROPHILS NFR BLD: 91.5 % (ref 38–73)
NRBC BLD-RTO: 0 /100 WBC
PLATELET # BLD AUTO: 136 K/UL (ref 150–450)
PMV BLD AUTO: 11.6 FL (ref 9.2–12.9)
POTASSIUM SERPL-SCNC: 4.2 MMOL/L (ref 3.5–5.1)
PROT SERPL-MCNC: 7.3 G/DL (ref 6–8.4)
RBC # BLD AUTO: 4.32 M/UL (ref 4–5.4)
SODIUM SERPL-SCNC: 141 MMOL/L (ref 136–145)
WBC # BLD AUTO: 8.08 K/UL (ref 3.9–12.7)

## 2022-08-23 PROCEDURE — 63600175 PHARM REV CODE 636 W HCPCS: Performed by: EMERGENCY MEDICINE

## 2022-08-23 PROCEDURE — 96375 TX/PRO/DX INJ NEW DRUG ADDON: CPT

## 2022-08-23 PROCEDURE — 25000003 PHARM REV CODE 250: Performed by: EMERGENCY MEDICINE

## 2022-08-23 PROCEDURE — 96374 THER/PROPH/DIAG INJ IV PUSH: CPT

## 2022-08-23 PROCEDURE — 93005 ELECTROCARDIOGRAM TRACING: CPT

## 2022-08-23 PROCEDURE — 93010 ELECTROCARDIOGRAM REPORT: CPT | Mod: ,,, | Performed by: INTERNAL MEDICINE

## 2022-08-23 PROCEDURE — 99284 EMERGENCY DEPT VISIT MOD MDM: CPT | Mod: 25

## 2022-08-23 PROCEDURE — 93010 EKG 12-LEAD: ICD-10-PCS | Mod: ,,, | Performed by: INTERNAL MEDICINE

## 2022-08-23 PROCEDURE — 85025 COMPLETE CBC W/AUTO DIFF WBC: CPT | Performed by: PHYSICIAN ASSISTANT

## 2022-08-23 PROCEDURE — 96361 HYDRATE IV INFUSION ADD-ON: CPT

## 2022-08-23 PROCEDURE — 80053 COMPREHEN METABOLIC PANEL: CPT | Performed by: PHYSICIAN ASSISTANT

## 2022-08-23 RX ORDER — ONDANSETRON 2 MG/ML
4 INJECTION INTRAMUSCULAR; INTRAVENOUS
Status: COMPLETED | OUTPATIENT
Start: 2022-08-23 | End: 2022-08-23

## 2022-08-23 RX ORDER — MECLIZINE HYDROCHLORIDE 25 MG/1
25 TABLET ORAL 3 TIMES DAILY PRN
Qty: 20 TABLET | Refills: 0 | Status: SHIPPED | OUTPATIENT
Start: 2022-08-23 | End: 2023-06-01

## 2022-08-23 RX ORDER — MECLIZINE HCL 12.5 MG 12.5 MG/1
12.5 TABLET ORAL
Status: DISCONTINUED | OUTPATIENT
Start: 2022-08-23 | End: 2022-08-23

## 2022-08-23 RX ORDER — MECLIZINE HYDROCHLORIDE 25 MG/1
25 TABLET ORAL
Status: COMPLETED | OUTPATIENT
Start: 2022-08-23 | End: 2022-08-23

## 2022-08-23 RX ORDER — SCOLOPAMINE TRANSDERMAL SYSTEM 1 MG/1
1 PATCH, EXTENDED RELEASE TRANSDERMAL
Status: DISCONTINUED | OUTPATIENT
Start: 2022-08-23 | End: 2022-08-23 | Stop reason: HOSPADM

## 2022-08-23 RX ORDER — ONDANSETRON 4 MG/1
4 TABLET, ORALLY DISINTEGRATING ORAL EVERY 8 HOURS PRN
Qty: 20 TABLET | Refills: 0 | Status: SHIPPED | OUTPATIENT
Start: 2022-08-23 | End: 2023-06-01

## 2022-08-23 RX ORDER — DROPERIDOL 2.5 MG/ML
1.25 INJECTION, SOLUTION INTRAMUSCULAR; INTRAVENOUS ONCE
Status: COMPLETED | OUTPATIENT
Start: 2022-08-23 | End: 2022-08-23

## 2022-08-23 RX ORDER — MAG HYDROX/ALUMINUM HYD/SIMETH 200-200-20
15 SUSPENSION, ORAL (FINAL DOSE FORM) ORAL
Status: COMPLETED | OUTPATIENT
Start: 2022-08-23 | End: 2022-08-23

## 2022-08-23 RX ADMIN — SODIUM CHLORIDE, SODIUM LACTATE, POTASSIUM CHLORIDE, AND CALCIUM CHLORIDE 1000 ML: .6; .31; .03; .02 INJECTION, SOLUTION INTRAVENOUS at 05:08

## 2022-08-23 RX ADMIN — ALUMINUM HYDROXIDE, MAGNESIUM HYDROXIDE, AND SIMETHICONE 15 ML: 200; 200; 20 SUSPENSION ORAL at 08:08

## 2022-08-23 RX ADMIN — DROPERIDOL 1.25 MG: 2.5 INJECTION, SOLUTION INTRAMUSCULAR; INTRAVENOUS at 08:08

## 2022-08-23 RX ADMIN — MECLIZINE HYDROCHLORIDE 25 MG: 25 TABLET ORAL at 05:08

## 2022-08-23 RX ADMIN — ONDANSETRON 4 MG: 2 INJECTION INTRAMUSCULAR; INTRAVENOUS at 05:08

## 2022-08-23 RX ADMIN — SCOPOLAMINE 1 PATCH: 1.5 PATCH, EXTENDED RELEASE TRANSDERMAL at 07:08

## 2022-08-23 NOTE — ED PROVIDER NOTES
"Encounter Date: 8/23/2022    SCRIBE #1 NOTE: I, Michael Ling, am scribing for, and in the presence of, Miguel Cook MD.       History     Chief Complaint   Patient presents with    Dizziness     Pt c/o "very bad vertigo" +n/v all day per pt.      Time seen by provider: 4:00 PM    This is a 51 y.o. female who presents with complaint of headaches and dizziness that woke her up around 2:00 am this morning. The patient notes a history of vertigo and notes a similar incident that occurred 7 years ago. She states she attempted to perform the Epley maneuver with no success. She states she is unable to walk due to her dizziness. She also complains of nausea and states she feels most nauseous when moving. She denies any changes to her vision. She notes allergies to penicillin and Demerol. She denies smoking or drinking. This is the extent of the patient's complaints at this time.    The history is provided by the patient.     Review of patient's allergies indicates:   Allergen Reactions    Demerol [meperidine] Other (See Comments)     "I had it before a sedation and I didn't wake up when I was supposed to.  I was overly sedated."    Penicillins      Past Medical History:   Diagnosis Date    Abnormal Pap smear of cervix 2000    dysplasia - [place]?    Foot fracture, left     "stress fracture"    Frequent UTI      Past Surgical History:   Procedure Laterality Date    CERVICAL BIOPSY  W/ LOOP ELECTRODE EXCISION       Family History   Problem Relation Age of Onset    Cancer Maternal Grandmother     Colon cancer Maternal Grandmother     Breast cancer Mother     Ovarian cancer Neg Hx      Social History     Tobacco Use    Smoking status: Never Smoker    Smokeless tobacco: Never Used   Substance Use Topics    Alcohol use: No    Drug use: No     Review of Systems   Constitutional: Negative for activity change, appetite change, chills, diaphoresis and fever.   HENT: Negative for congestion, sore throat " and trouble swallowing.    Eyes: Negative for photophobia and visual disturbance.   Respiratory: Negative for cough, chest tightness and shortness of breath.    Cardiovascular: Negative for chest pain.   Gastrointestinal: Positive for nausea. Negative for abdominal pain and vomiting.   Endocrine: Negative for polydipsia and polyuria.   Genitourinary: Negative for difficulty urinating and flank pain.   Musculoskeletal: Negative for back pain and neck pain.   Skin: Negative for rash.   Neurological: Positive for dizziness and headaches. Negative for weakness.   Psychiatric/Behavioral: Negative for confusion.       Physical Exam     Initial Vitals [08/23/22 1409]   BP Pulse Resp Temp SpO2   (!) 110/53 (!) 56 18 97.3 °F (36.3 °C) 100 %      MAP       --         Physical Exam    Nursing note and vitals reviewed.  Constitutional: She appears well-developed and well-nourished. She is not diaphoretic. No distress.   HENT:   Head: Normocephalic and atraumatic.   Nose: Nose normal.   Eyes: EOM are normal. Pupils are equal, round, and reactive to light.   Neck: Neck supple. No JVD present.   Normal range of motion.  Cardiovascular: Normal rate, regular rhythm, normal heart sounds and intact distal pulses.   Pulmonary/Chest: Breath sounds normal. No stridor. No respiratory distress. She has no wheezes. She has no rales.   Abdominal: Abdomen is soft. Bowel sounds are normal. She exhibits no distension. There is no abdominal tenderness.   Musculoskeletal:         General: No tenderness or edema. Normal range of motion.      Cervical back: Normal range of motion and neck supple.     Neurological: She is alert and oriented to person, place, and time. She has normal strength.   Mayra-hallpike positive to right side   Skin: Skin is warm and dry. Capillary refill takes less than 2 seconds. No rash noted. No erythema.         ED Course   Procedures  Labs Reviewed   CBC W/ AUTO DIFFERENTIAL - Abnormal; Notable for the following  components:       Result Value    MCH 31.3 (*)     Platelets 136 (*)     Lymph # 0.5 (*)     Mono # 0.2 (*)     Gran % 91.5 (*)     Lymph % 5.8 (*)     Mono % 2.2 (*)     All other components within normal limits   COMPREHENSIVE METABOLIC PANEL          Imaging Results    None          Medications   scopolamine 1.3-1.5 mg (1 mg over 3 days) 1 patch (1 patch Transdermal Patch Applied 8/23/22 1908)   ondansetron injection 4 mg (4 mg Intravenous Given 8/23/22 1720)   meclizine tablet 25 mg (25 mg Oral Given 8/23/22 1720)   lactated ringers bolus 1,000 mL (0 mLs Intravenous Stopped 8/23/22 2055)   droperidoL injection 1.25 mg (1.25 mg Intravenous Given 8/23/22 2011)   aluminum-magnesium hydroxide-simethicone 200-200-20 mg/5 mL suspension 15 mL (15 mLs Oral Given 8/23/22 2010)     Medical Decision Making:   History:   Old Medical Records: I decided to obtain old medical records.  Independently Interpreted Test(s):   I have ordered and independently interpreted EKG Reading(s) - see prior notes  Clinical Tests:   Lab Tests: Ordered and Reviewed  Medical Tests: Ordered and Reviewed    MDM:    51-year-old female with past medical history as noted above presenting with dizziness.  Easily provoked with right-sided Mayra-Hallpike maneuver, consistent with BPPV.  Patient's blood work is otherwise unremarkable, attempted Epley maneuver multiple times with some significant improvement and dizziness however patient still reporting some ongoing nausea.  Additional medications given, IV fluids, Zofran/meclizine, droperidol with improvement.  Patient tolerating p.o., ambulatory with steady gait.  At this point time based on physical exam evaluation not suspect acute CVA/TIA, seizure, arrhythmia, acute mi/ACS, electrolyte derangement, or any further surgical or medical emergency.  Discussed diagnosis and further treatment with patient, including f/u.  Return precautions given and all questions answered.  Patient in understanding of plan.   Pt discharged to home improved and stable.          Scribe Attestation:   Scribe #1: I performed the above scribed service and the documentation accurately describes the services I performed. I attest to the accuracy of the note.          Physician Attestation for Scribe: I, Miguel Cook M.D., reviewed documentation as scribed in my presence, which is both accurate and complete.       Clinical Impression:   Final diagnoses:  [R42] Dizziness  [H81.10] Benign paroxysmal positional vertigo, unspecified laterality (Primary)          ED Disposition Condition    Discharge Stable        ED Prescriptions     Medication Sig Dispense Start Date End Date Auth. Provider    meclizine (ANTIVERT) 25 mg tablet Take 1 tablet (25 mg total) by mouth 3 (three) times daily as needed for Dizziness or Nausea. 20 tablet 8/23/2022  Miguel Cook MD    ondansetron (ZOFRAN-ODT) 4 MG TbDL Take 1 tablet (4 mg total) by mouth every 8 (eight) hours as needed (nausea). 20 tablet 8/23/2022  Miguel Cook MD        Follow-up Information     Follow up With Specialties Details Why Contact Info    [facility] - Emergency Dept Emergency Medicine Go to  [facility] symptoms worsen [address] [facility] [id number] [phone]           Miguel Cook MD  08/23/22 2055

## 2022-08-23 NOTE — ED TRIAGE NOTES
"Pt presents to ED c/o dizziness "all day." Pt states she is having a severe vertigo spell with +N/V. Vomited approx 10 times today. Hx of vertigo, but never prescribed any medications/  "

## 2022-08-23 NOTE — ED NOTES
IV access unsuccessful x 2 attempts. Blood obtained for labs. Cathy and/or Livier will try for IV access.

## 2022-08-23 NOTE — FIRST PROVIDER EVALUATION
" Emergency Department TeleTriage Encounter Note      CHIEF COMPLAINT    Chief Complaint   Patient presents with    Dizziness     Pt c/o "very bad vertigo" +n/v all day per pt.        VITAL SIGNS   Initial Vitals [08/23/22 1409]   BP Pulse Resp Temp SpO2   (!) 110/53 (!) 56 18 97.3 °F (36.3 °C) 100 %      MAP       --            ALLERGIES    Review of patient's allergies indicates:   Allergen Reactions    Demerol [meperidine] Other (See Comments)     "I had it before a sedation and I didn't wake up when I was supposed to.  I was overly sedated."    Penicillins        PROVIDER TRIAGE NOTE  This is a teletriage evaluation of a 51 y.o. female presenting to the ED complaining of dizziness with sensation of room spinning and associated nausea and vomiting all day.  No chest pain or palpitations.  No recent cold or sinus congestion.  Mild associated headache.  Similar symptoms 7 years ago.      Initial orders will be placed and care will be transferred to an alternate provider when patient is roomed for a full evaluation. Any additional orders and the final disposition will be determined by that provider.           ORDERS  Labs Reviewed   CBC W/ AUTO DIFFERENTIAL   COMPREHENSIVE METABOLIC PANEL       ED Orders (720h ago, onward)    Start Ordered     Status Ordering Provider    08/23/22 1445 08/23/22 1444  Saline lock IV  Once         Ordered ZARIA DUVALL SAmol    08/23/22 1445 08/23/22 1444  CBC auto differential  STAT         Ordered ZARIA DUVALL.    08/23/22 1445 08/23/22 1444  Comprehensive metabolic panel  STAT         Ordered ZARIA DUVALL    08/23/22 1445 08/23/22 1444  EKG 12-lead  Once         Ordered ZARIA DUVALL            Virtual Visit Note: The provider triage portion of this emergency department evaluation and documentation was performed via [employer], a HIPAA-compliant telemedicine application, in concert with a tele-presenter in the room. A face to face patient evaluation with " one of my colleagues will occur once the patient is placed in an emergency department room.      DISCLAIMER: This note was prepared with [employer] voice recognition transcription software. Garbled syntax, mangled pronouns, and other bizarre constructions may be attributed to that software system.

## 2022-08-24 NOTE — ED NOTES
Orthostatics vitals:    Lying down: B/p 111/62, spo2 100 pulse 58  Sitting: b/p 121/64, spo2 99 pulse 57  Standing: b/p 119/57, spo2 95 pulse 63

## 2022-11-01 ENCOUNTER — HOSPITAL ENCOUNTER (OUTPATIENT)
Dept: RADIOLOGY | Facility: OTHER | Age: 52
Discharge: HOME | End: 2022-11-01
Attending: OBSTETRICS & GYNECOLOGY
Payer: COMMERCIAL

## 2022-11-01 DIAGNOSIS — Z12.31 VISIT FOR SCREENING MAMMOGRAM: ICD-10-CM

## 2022-11-01 PROCEDURE — 77067 MAMMO DIGITAL SCREENING BILAT WITH TOMO: ICD-10-PCS | Mod: 26,,, | Performed by: RADIOLOGY

## 2022-11-01 PROCEDURE — 77067 SCR MAMMO BI INCL CAD: CPT | Mod: 26,,, | Performed by: RADIOLOGY

## 2022-11-01 PROCEDURE — 77063 BREAST TOMOSYNTHESIS BI: CPT | Mod: TC

## 2022-11-01 PROCEDURE — 77063 BREAST TOMOSYNTHESIS BI: CPT | Mod: 26,,, | Performed by: RADIOLOGY

## 2022-11-01 PROCEDURE — 77063 MAMMO DIGITAL SCREENING BILAT WITH TOMO: ICD-10-PCS | Mod: 26,,, | Performed by: RADIOLOGY

## 2022-11-05 ENCOUNTER — PATIENT MESSAGE (OUTPATIENT)
Dept: OBSTETRICS AND GYNECOLOGY | Facility: CLINIC | Age: 52
End: 2022-11-05
Payer: COMMERCIAL

## 2023-04-10 ENCOUNTER — OFFICE VISIT (OUTPATIENT)
Dept: URGENT CARE | Facility: CLINIC | Age: 53
End: 2023-04-10
Payer: COMMERCIAL

## 2023-04-10 VITALS
TEMPERATURE: 97 F | DIASTOLIC BLOOD PRESSURE: 66 MMHG | RESPIRATION RATE: 16 BRPM | OXYGEN SATURATION: 100 % | HEIGHT: 67 IN | HEART RATE: 68 BPM | SYSTOLIC BLOOD PRESSURE: 104 MMHG | WEIGHT: 110 LBS | BODY MASS INDEX: 17.27 KG/M2

## 2023-04-10 DIAGNOSIS — M25.562 ACUTE PAIN OF LEFT KNEE: Primary | ICD-10-CM

## 2023-04-10 PROCEDURE — 99203 OFFICE O/P NEW LOW 30 MIN: CPT | Mod: S$GLB,,,

## 2023-04-10 PROCEDURE — 73562 X-RAY EXAM OF KNEE 3: CPT | Mod: LT,S$GLB,, | Performed by: RADIOLOGY

## 2023-04-10 PROCEDURE — 99203 PR OFFICE/OUTPT VISIT, NEW, LEVL III, 30-44 MIN: ICD-10-PCS | Mod: S$GLB,,,

## 2023-04-10 PROCEDURE — 73562 XR KNEE 3 VIEW LEFT: ICD-10-PCS | Mod: LT,S$GLB,, | Performed by: RADIOLOGY

## 2023-04-10 NOTE — PROGRESS NOTES
"Subjective:      Patient ID: Shantell Aguilar is a 52 y.o. female.    Vitals:  height is 5' 7" (1.702 m) and weight is 49.9 kg (110 lb). Her oral temperature is 97.4 °F (36.3 °C). Her blood pressure is 104/66 and her pulse is 68. Her respiration is 16 and oxygen saturation is 100%.     Chief Complaint: Knee Pain    Patient presents with pain in her left knee that began a week ago. 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 She also states that her knee feels "crunchy" on the side. She states that she has no knowledge of any prior injury outside of dancing. She can bear weight but is limping while walking. Pain is worse upon standing and ambulation. Pain is better when resting/elevating. Has not tried tylenol or ibuprofen. She has not noticed swelling. No hx of arthritis.     Knee Pain   The incident occurred 5 to 7 days ago. The injury mechanism is unknown. The pain is present in the left knee. The quality of the pain is described as aching. The pain is at a severity of 6/10. The pain is moderate. The pain has been Constant since onset. Pertinent negatives include no loss of motion, loss of sensation, numbness or tingling. She reports no foreign bodies present. The symptoms are aggravated by movement, palpation and weight bearing. She has tried nothing for the symptoms.     Constitution: Negative for chills, fatigue and fever.   Musculoskeletal:  Positive for pain and joint pain. Negative for trauma, joint swelling and abnormal ROM of joint.   Skin:  Negative for erythema and bruising.   Neurological:  Negative for numbness and tingling.    Objective:     Physical Exam   Constitutional: She is oriented to person, place, and time. She appears well-developed.   HENT:   Head: Normocephalic and atraumatic. Head is without abrasion, without contusion and without laceration.   Ears:   Right Ear: External ear normal.   Left " Ear: External ear normal.   Nose: Nose normal.   Mouth/Throat: Oropharynx is clear and moist and mucous membranes are normal.   Eyes: Conjunctivae, EOM and lids are normal. Pupils are equal, round, and reactive to light.   Neck: Trachea normal and phonation normal. Neck supple.   Cardiovascular: Normal rate, regular rhythm and normal heart sounds.   Pulmonary/Chest: Effort normal and breath sounds normal. No stridor. No respiratory distress.   Musculoskeletal: Normal range of motion.         General: Normal range of motion.      Left knee: She exhibits normal range of motion, no swelling, no ecchymosis, no deformity, no erythema and no bony tenderness. Tenderness found. Medial joint line tenderness noted.   Neurological: She is alert and oriented to person, place, and time.   Skin: Skin is warm, dry, intact and no rash. Capillary refill takes less than 2 seconds. No abrasion, No burn, No bruising, No erythema and No ecchymosis   Psychiatric: Her speech is normal and behavior is normal. Judgment and thought content normal.   Nursing note and vitals reviewed.      EXAMINATION:  XR KNEE 3 VIEW LEFT     CLINICAL HISTORY:  Pain in left knee     TECHNIQUE:  AP, lateral, and Merchant views of the left knee were performed.     COMPARISON:  None     FINDINGS:  No fracture or dislocation.  No bone destruction identified     Impression:     See above        Electronically signed by: Sawyer Armas MD  Date:                                            04/10/2023  Time:                                           10:25  Assessment:     1. Acute pain of left knee        Plan:       Acute pain of left knee  -     XR KNEE 3 VIEW LEFT; Future; Expected date: 04/10/2023  -     Ambulatory referral/consult to Orthopedics  -     BANDAGE ELASTIC 6IN ACE                Discussed results/diagnosis/plan with patient in clinic. Strict precautions given to patient to monitor for worsening signs and symptoms. Advised to follow up with PCP or  specialist.  Explained side effects of medications prescribed with patient and informed him/her to discontinue use if he/she has any side effects and to inform UC or PCP if this occurs. All questions answered. Strict ED verses clinic return precautions stressed and given in depth. Advised if symptoms worsens of fail to improve he/she should go to the Emergency Room. Discharge and follow-up instructions given verbally/printed with the patient who expressed understanding and willingness to comply with my recommendations. Patient voiced understanding and in agreement with current treatment plan. Patient exits the exam room in no acute distress. Conversant and engaged during discharge discussion, verbalized understanding.

## 2023-04-10 NOTE — PATIENT INSTRUCTIONS
Apply a compressive ACE bandage. Rest and elevate the affected painful area.  Apply cold compresses intermittently as needed.  Avoid activities that cause pain. Alternate tylenol and ibuprofen every 4 hours as needed for pain. As pain recedes, begin normal activities slowly as tolerated.      Follow up with ortho for ongoing knee pain. A referral was placed for you, call [phone] to schedule appointment.

## 2023-04-11 ENCOUNTER — HOSPITAL ENCOUNTER (OUTPATIENT)
Dept: RADIOLOGY | Facility: HOSPITAL | Age: 53
Discharge: HOME | End: 2023-04-11
Attending: FAMILY MEDICINE
Payer: COMMERCIAL

## 2023-04-11 ENCOUNTER — OFFICE VISIT (OUTPATIENT)
Dept: SPORTS MEDICINE | Facility: CLINIC | Age: 53
End: 2023-04-11
Payer: COMMERCIAL

## 2023-04-11 VITALS — BODY MASS INDEX: 17.07 KG/M2 | TEMPERATURE: 98 F | WEIGHT: 109 LBS

## 2023-04-11 DIAGNOSIS — M25.562 LEFT KNEE PAIN, UNSPECIFIED CHRONICITY: ICD-10-CM
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
## 2023-04-11 DIAGNOSIS — M25.562 LEFT MEDIAL KNEE PAIN: Primary | ICD-10-CM

## 2023-04-11 DIAGNOSIS — M17.12 PRIMARY OSTEOARTHRITIS OF LEFT KNEE: ICD-10-CM

## 2023-04-11 PROCEDURE — 73564 XR KNEE ORTHO BILAT WITH FLEXION: ICD-10-PCS | Mod: 26,,, | Performed by: RADIOLOGY

## 2023-04-11 PROCEDURE — 99999 PR PBB SHADOW E&M-EST. PATIENT-LVL III: CPT | Mod: PBBFAC,,, | Performed by: FAMILY MEDICINE

## 2023-04-11 PROCEDURE — 99999 PR PBB SHADOW E&M-EST. PATIENT-LVL III: ICD-10-PCS | Mod: PBBFAC,,, | Performed by: FAMILY MEDICINE

## 2023-04-11 PROCEDURE — 3008F BODY MASS INDEX DOCD: CPT | Mod: CPTII,S$GLB,, | Performed by: FAMILY MEDICINE

## 2023-04-11 PROCEDURE — 73564 X-RAY EXAM KNEE 4 OR MORE: CPT | Mod: TC,50

## 2023-04-11 PROCEDURE — 3008F PR BODY MASS INDEX (BMI) DOCUMENTED: ICD-10-PCS | Mod: CPTII,S$GLB,, | Performed by: FAMILY MEDICINE

## 2023-04-11 PROCEDURE — 99204 OFFICE O/P NEW MOD 45 MIN: CPT | Mod: S$GLB,,, | Performed by: FAMILY MEDICINE

## 2023-04-11 PROCEDURE — 1160F RVW MEDS BY RX/DR IN RCRD: CPT | Mod: CPTII,S$GLB,, | Performed by: FAMILY MEDICINE

## 2023-04-11 PROCEDURE — 73564 X-RAY EXAM KNEE 4 OR MORE: CPT | Mod: 26,,, | Performed by: RADIOLOGY

## 2023-04-11 PROCEDURE — 1159F MED LIST DOCD IN RCRD: CPT | Mod: CPTII,S$GLB,, | Performed by: FAMILY MEDICINE

## 2023-04-11 PROCEDURE — 99204 PR OFFICE/OUTPT VISIT, NEW, LEVL IV, 45-59 MIN: ICD-10-PCS | Mod: S$GLB,,, | Performed by: FAMILY MEDICINE

## 2023-04-11 PROCEDURE — 1159F PR MEDICATION LIST DOCUMENTED IN MEDICAL RECORD: ICD-10-PCS | Mod: CPTII,S$GLB,, | Performed by: FAMILY MEDICINE

## 2023-04-11 PROCEDURE — 1160F PR REVIEW ALL MEDS BY PRESCRIBER/CLIN PHARMACIST DOCUMENTED: ICD-10-PCS | Mod: CPTII,S$GLB,, | Performed by: FAMILY MEDICINE

## 2023-04-11 NOTE — PROGRESS NOTES
Shantell Aguilar, a 52 y.o. female, presents today for evaluation of her left knee.      History of Present Illness (HPI)  Location: anterior knee   Onset: chronic, insidious  Palliative:    relative rest   oral analgesics, OTC  Provocative: prolonged ambulation, activity, dance, knee flexion  Prior: none  Progression: plateau discomfort  Quality: sharp  Radiation: none  Severity: per nursing documentation  Timing: intermittent w/ use  Trauma: none    Review of Systems (ROS)  A 10+ review of systems was performed with pertinent positives and negatives noted above in the history of present illness. Other systems were negative unless otherwise specified.    Physical Examination (PE)  General:  The patient is alert and oriented x 3. Mood is pleasant. Observation of ears, eyes and nose reveal no gross abnormalities. HEENT: NCAT, sclera anicteric.   Lungs: Respirations are equal and unlabored.  Gait is coordinated. Patient can toe walk and heel walk without difficulty.    KNEE EXAMINATION    Observation/Inspection  Gait:   Nonantalgic   Alignment:  Neutral   Scars:   None   Muscle atrophy: Mild  Effusion:  None   Warmth:  None   Discoloration:   none     Tenderness / Crepitus (T / C):         T / C      T / C  Patella   - / -   Lateral joint line   - / -     Peripatellar medial  -  Medial joint line    + / -  Peripatellar lateral -  Medial plica   - / -  Patellar tendon -   Popliteal fossa   - / -  Quad tendon   -   Gastrocnemius   -  Prepatellar Bursa - / -   Quadricep   -  Tibial tubercle  -  Thigh/hamstring  -  Pes anserine/HS -  Fibula    -  ITB   - / -  Tibia     -  Tib/fib joint  - / -  LCL    -    MFC   - / -   MCL: Proximal  -    LFC   - / -   Distal    -          ROM: (* = pain)  PASSIVE   ACTIVE    Left :   5 / 0 / 145   5 / 0 / 145     Right :    5 / 0 / 145   5 / 0 / 145    Patellofemoral examination:  See above noted areas of tenderness.   Patella position    Subluxation / dislocation: Centered         Sup. / Inf;   Normal   Crepitus (PF):    Absent   Patellar Mobility:       Medial-lateral:   Normal    Superior-inferior:  Normal    Inferior tilt   Normal    Patellar tendon:  Normal   Lateral tilt:    Normal   J-sign:     None   Patellofemoral grind:   No pain     Meniscal Signs:     Pain on terminal extension:  +  Pain on terminal flexion:  +  Taniyas maneuver:  -  Squat     NT  Thesaly    NT    Ligament Examination:  ACL / Lachman:  WNL  PCL-Post.  drawer: normal 0 to 2mm  MCL- Valgus:  normal 0 to 2mm  LCL- Varus:    normal 0 to 2mm  Pivot shift:  guarding   Dial Test:   difference c/w other side   At 30° flexion: normal (< 5°)    At 90° flexion: normal (< 5°)   Reverse Pivot Shift:   normal (Equal)     Strength: (* = with pain) Painful Side  Quadriceps   5/5  Hamstrin/5    Extremity Neuro-vascular Examination:   Sensation:  Grossly intact to light touch all dermatomal regions.   Motor Function:  Fully intact motor function at hip, knee, foot and ankle    DTRs;  quadriceps and  achilles 2+.  No clonus and downgoing Babinski.    Vascular status:  DP and PT pulses 2+, brisk capillary refill, symmetric.     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, dior med compartment, left   Medial knee pain  W/ asx dist quad tend enthesophyte    No evidence of neurologic pathology  No evidence of vascular pathology    Imaging studies reviewed:   X-ray knee, bilateral 22.04    Plan  Discussed overuse nature of injury    We discussed options including    Watchful waiting / relative rest    Physical therapy X  discussed; deferred by pt    Injection therapy CSI iaknee   discussed; deferred by pt   Consultation    The patient chooses As above   x = prescribed  CSI = corticosteroid injection  VSI = viscosupplement injection  PRPI = platelet rich plasma injection  ia = intra articular  R = right  L = left  B = bilateral   nfSx = surgical consultation was  recommended, but patient is not interested in consultation at this time    Physical Therapy        Formal (fPT), @ [facility] facility    Formal (fPT), @ [facility] facility        Homegoing (hgPT), per concurrent fPT recommendations    Homegoing (hgPT), per prior fPT recommendations    Homegoing (hgPT), handout provided        w/  (atPT)    [blank] = not prescribed  x = prescribed  b = prescribed, and begin as indicated  t = continue as indicated  r = prescribed, and restart as indicated  p = completed prior as indicated  hs = prescribed, and with high school   col = prescribed, and with college or university   nfPT = physical therapy was recommended, but patient is not interested in PT at this time    Activity (e.g. sports, work) restrictions    [blank] = as tolerated  pt = per physical therapist  at = per   NWB = non weight bearing on affected lower extremity, with crutches assistance for ambulation    Bracing    [blank] = not prescribed  r = recommended, but not fit with at todays visit  f = prescribed and fit with at todays visit  t = continue as indicated  d = d/c  p = as needed  rare = use on rare, as-needed basis; advised against chronic use    Pain management Discussed otc nsaids   [blank] = No prescription necessary. A handout detailing dosing of appropriate   over-the-counter musculoskeletal analgesics was made available to the patient.   m = meloxicam x 14 days  mp = 14 day course of meloxicam prescribed prior    Follow up    [blank] = as needed  [number] = in [number] weeks  CSI = for corticosteroid injection  VSI = for viscosupplement injection or injection series  PRP = for platelet rich plasma injection or injection series  MRI = after MRI imaging  ns = should surgical options be deferred (no surgery)  o = appointment offered, deferred by patient    Should symptoms worsen or fail to resolve, consider    Revisiting the above options and / or       Vocation:    fit  dancer

## 2023-06-01 ENCOUNTER — OFFICE VISIT (OUTPATIENT)
Dept: OBSTETRICS AND GYNECOLOGY | Facility: CLINIC | Age: 53
End: 2023-06-01
Attending: OBSTETRICS & GYNECOLOGY
Payer: COMMERCIAL

## 2023-06-01 VITALS
HEIGHT: 67 IN | BODY MASS INDEX: 17.61 KG/M2 | WEIGHT: 112.19 LBS | SYSTOLIC BLOOD PRESSURE: 120 MMHG | DIASTOLIC BLOOD PRESSURE: 70 MMHG

## 2023-06-01 DIAGNOSIS — Z78.0 POSTMENOPAUSAL STATUS: ICD-10-CM

## 2023-06-01 DIAGNOSIS — Z01.419 WOMEN'S ANNUAL ROUTINE GYNECOLOGICAL EXAMINATION: Primary | ICD-10-CM

## 2023-06-01 DIAGNOSIS — Z12.4 PAP SMEAR FOR CERVICAL CANCER SCREENING: ICD-10-CM

## 2023-06-01 PROCEDURE — 88175 CYTOPATH C/V AUTO FLUID REDO: CPT | Performed by: OBSTETRICS & GYNECOLOGY

## 2023-06-01 PROCEDURE — 1159F PR MEDICATION LIST DOCUMENTED IN MEDICAL RECORD: ICD-10-PCS | Mod: CPTII,S$GLB,, | Performed by: OBSTETRICS & GYNECOLOGY

## 2023-06-01 PROCEDURE — 99999 PR PBB SHADOW E&M-EST. PATIENT-LVL III: ICD-10-PCS | Mod: PBBFAC,,, | Performed by: OBSTETRICS & GYNECOLOGY

## 2023-06-01 PROCEDURE — 87624 HPV HI-RISK TYP POOLED RSLT: CPT | Performed by: OBSTETRICS & GYNECOLOGY

## 2023-06-01 PROCEDURE — 3078F PR MOST RECENT DIASTOLIC BLOOD PRESSURE < 80 MM HG: ICD-10-PCS | Mod: CPTII,S$GLB,, | Performed by: OBSTETRICS & GYNECOLOGY

## 2023-06-01 PROCEDURE — 3074F SYST BP LT 130 MM HG: CPT | Mod: CPTII,S$GLB,, | Performed by: OBSTETRICS & GYNECOLOGY

## 2023-06-01 PROCEDURE — 99396 PREV VISIT EST AGE 40-64: CPT | Mod: S$GLB,,, | Performed by: OBSTETRICS & GYNECOLOGY

## 2023-06-01 PROCEDURE — 3008F PR BODY MASS INDEX (BMI) DOCUMENTED: ICD-10-PCS | Mod: CPTII,S$GLB,, | Performed by: OBSTETRICS & GYNECOLOGY

## 2023-06-01 PROCEDURE — 3078F DIAST BP <80 MM HG: CPT | Mod: CPTII,S$GLB,, | Performed by: OBSTETRICS & GYNECOLOGY

## 2023-06-01 PROCEDURE — 1159F MED LIST DOCD IN RCRD: CPT | Mod: CPTII,S$GLB,, | Performed by: OBSTETRICS & GYNECOLOGY

## 2023-06-01 PROCEDURE — 99396 PR PREVENTIVE VISIT,EST,40-64: ICD-10-PCS | Mod: S$GLB,,, | Performed by: OBSTETRICS & GYNECOLOGY

## 2023-06-01 PROCEDURE — 1160F PR REVIEW ALL MEDS BY PRESCRIBER/CLIN PHARMACIST DOCUMENTED: ICD-10-PCS | Mod: CPTII,S$GLB,, | Performed by: OBSTETRICS & GYNECOLOGY

## 2023-06-01 PROCEDURE — 1160F RVW MEDS BY RX/DR IN RCRD: CPT | Mod: CPTII,S$GLB,, | Performed by: OBSTETRICS & GYNECOLOGY

## 2023-06-01 PROCEDURE — 99999 PR PBB SHADOW E&M-EST. PATIENT-LVL III: CPT | Mod: PBBFAC,,, | Performed by: OBSTETRICS & GYNECOLOGY

## 2023-06-01 PROCEDURE — 3008F BODY MASS INDEX DOCD: CPT | Mod: CPTII,S$GLB,, | Performed by: OBSTETRICS & GYNECOLOGY

## 2023-06-01 PROCEDURE — 3074F PR MOST RECENT SYSTOLIC BLOOD PRESSURE < 130 MM HG: ICD-10-PCS | Mod: CPTII,S$GLB,, | Performed by: OBSTETRICS & GYNECOLOGY

## 2023-06-01 RX ORDER — MODERNA COVID-19 VACCINE, BIVALENT 25; 25 UG/.5ML; UG/.5ML
INJECTION, SUSPENSION INTRAMUSCULAR
COMMUNITY
Start: 2023-04-10

## 2023-06-01 NOTE — PROGRESS NOTES
"Shantell Aguilar is a 52 y.o. year old  female who presents for routine GYN exam.  She is postmenopausal, not on hormones.  Denies bleeding and hot flashes have essentially resolved.  Pap  had negative cytology but other high-risk HPV present.  Denies recent changes in her medical or surgical history.  No gyn complaints.    Blood pressure 120/70, height 5' 7" (1.702 m), weight 50.9 kg (112 lb 3.4 oz), last menstrual period 07/15/2016.    22 Pap: Negative, other HR HPV    22 Mammogram: Negative, TC 15.18%      Past Medical History:   Diagnosis Date    Abnormal Pap smear of cervix     dysplasia - Conization?    Foot fracture, left     "stress fracture"    Frequent UTI        Past Surgical History:   Procedure Laterality Date    BREAST BIOPSY      CERVICAL BIOPSY  W/ LOOP ELECTRODE EXCISION         OB History          1    Para   0    Term   0            AB   1    Living             SAB   1    IAB        Ectopic        Multiple        Live Births                       ROS:  GENERAL: Feeling well overall.   SKIN: Denies rash or lesions.   HEAD: Denies head injury or headache.   NODES: Denies enlarged lymph nodes.   CHEST: Denies chest pain or shortness of breath.   CARDIOVASCULAR: Denies palpitations or left sided chest pain.   ABDOMEN: No abdominal pain, nausea, vomiting or rectal bleeding.   URINARY: No dysuria or hematuria.  REPRODUCTIVE: See HPI.   BREASTS: Denies pain, lumps, or nipple discharge.   HEMATOLOGIC: No easy bruisability or excessive bleeding.   MUSCULOSKELETAL: Reports some joint discomfort.  NEUROLOGIC: Denies syncope or weakness.   PSYCHIATRIC: Denies depression.     PE:   (chaperone present during entire exam)  APPEARANCE: Well nourished, well developed, in no acute distress.  BREASTS: Symmetrical, no skin changes or visible lesions. No palpable masses, nipple discharge or adenopathy bilaterally.  ABDOMEN: Soft. No tenderness or masses. No hernias. No CVA " tenderness.  VULVA: No lesions. Normal female genitalia.  URETHRAL MEATUS: Normal size and location, no lesions, no prolapse.  URETHRA: No masses, tenderness, prolapse or scarring.  VAGINA: No lesions, no abnormal discharge, no significant cystocele or rectocele.  CERVIX: No lesions and discharge. PAP done.  UTERUS: Normal size, regular shape, mobile, non-tender, bladder base nontender.  ADNEXA: No masses, tenderness or CDS nodularity.  ANUS PERINEUM: Normal.    Diagnosis:  1. Women's annual routine gynecological examination    2. Pap smear for cervical cancer screening    3. Postmenopausal status          PLAN:    Orders Placed This Encounter    HPV High Risk Genotypes, PCR    Liquid-Based Pap Smear, Screening       Patient was counseled today on postmenopausal issues.    Follow-up in 1 year.    This note was created with voice recognition software.  Grammatical, syntax and spelling errors may be inevitable.

## 2023-06-08 LAB
FINAL PATHOLOGIC DIAGNOSIS: NORMAL
Lab: NORMAL

## 2023-06-09 ENCOUNTER — TELEPHONE (OUTPATIENT)
Dept: OBSTETRICS AND GYNECOLOGY | Facility: CLINIC | Age: 53
End: 2023-06-09
Payer: COMMERCIAL

## 2023-06-09 ENCOUNTER — PATIENT MESSAGE (OUTPATIENT)
Dept: OBSTETRICS AND GYNECOLOGY | Facility: CLINIC | Age: 53
End: 2023-06-09
Payer: COMMERCIAL

## 2023-06-09 NOTE — TELEPHONE ENCOUNTER
Called patient:    Message left to call us.    [Pap with negative cytology, other HR HPV present for two consecutive times.  REC:  Colpo]

## 2023-06-09 NOTE — TELEPHONE ENCOUNTER
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    REC:  Colpo - will schedule

## 2023-06-09 NOTE — TELEPHONE ENCOUNTER
----- Message from Jacque Garcia sent at 6/9/2023  4:23 PM CDT -----  Regarding: returning dr call    Type:  Patient Returning Call    Who Called:VITOR VALENTINO [[id number]]      Who Left Message for Patient:Dr Shea    Does the patient know what this is regarding?    Best Call Back Number:[phone]    Additional Information:

## 2023-06-20 ENCOUNTER — TELEPHONE (OUTPATIENT)
Dept: OBSTETRICS AND GYNECOLOGY | Facility: CLINIC | Age: 53
End: 2023-06-20
Payer: COMMERCIAL

## 2023-06-20 NOTE — TELEPHONE ENCOUNTER
----- Message from Josi Hodge sent at 6/20/2023  2:34 PM CDT -----  Pt called in stating she is returning the provide's call, pls call back.

## 2023-06-20 NOTE — TELEPHONE ENCOUNTER
----- Message from Bushra Mckeon MA sent at 6/12/2023  3:05 PM CDT -----  MD Monse Patel Staff 7 hours ago (7:35 AM)      Please help her schedule colpo.   She is aware.   Thanks.     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     REC:  Colpo - will schedule      Note

## 2023-06-29 ENCOUNTER — PROCEDURE VISIT (OUTPATIENT)
Dept: OBSTETRICS AND GYNECOLOGY | Facility: CLINIC | Age: 53
End: 2023-06-29
Attending: OBSTETRICS & GYNECOLOGY
Payer: COMMERCIAL

## 2023-06-29 VITALS — HEIGHT: 66 IN | WEIGHT: 110.25 LBS | BODY MASS INDEX: 17.72 KG/M2

## 2023-06-29 DIAGNOSIS — Z78.0 POSTMENOPAUSAL STATUS: ICD-10-CM

## 2023-06-29 DIAGNOSIS — R87.810 PAP SMEAR OF CERVIX SHOWS HIGH RISK HPV PRESENT: Primary | ICD-10-CM

## 2023-06-29 LAB
B-HCG UR QL: NEGATIVE
CTP QC/QA: YES

## 2023-06-29 PROCEDURE — 88342 CHG IMMUNOCYTOCHEMISTRY: ICD-10-PCS | Mod: 26,,, | Performed by: PATHOLOGY

## 2023-06-29 PROCEDURE — 88342 IMHCHEM/IMCYTCHM 1ST ANTB: CPT | Mod: 26,,, | Performed by: PATHOLOGY

## 2023-06-29 PROCEDURE — 81025 PR  URINE PREGNANCY TEST: ICD-10-PCS | Mod: S$GLB,,, | Performed by: OBSTETRICS & GYNECOLOGY

## 2023-06-29 PROCEDURE — 88305 TISSUE EXAM BY PATHOLOGIST: CPT | Mod: 59 | Performed by: PATHOLOGY

## 2023-06-29 PROCEDURE — 88342 IMHCHEM/IMCYTCHM 1ST ANTB: CPT | Performed by: PATHOLOGY

## 2023-06-29 PROCEDURE — 57454 BX/CURETT OF CERVIX W/SCOPE: CPT | Mod: S$GLB,,, | Performed by: OBSTETRICS & GYNECOLOGY

## 2023-06-29 PROCEDURE — 88305 TISSUE EXAM BY PATHOLOGIST: ICD-10-PCS | Mod: 26,,, | Performed by: PATHOLOGY

## 2023-06-29 PROCEDURE — 88305 TISSUE EXAM BY PATHOLOGIST: CPT | Mod: 26,,, | Performed by: PATHOLOGY

## 2023-06-29 PROCEDURE — 81025 URINE PREGNANCY TEST: CPT | Mod: S$GLB,,, | Performed by: OBSTETRICS & GYNECOLOGY

## 2023-06-29 PROCEDURE — 57454 PR COLPOSC,CERVIX W/ADJ VAG,W/BX & CURRETAG: ICD-10-PCS | Mod: S$GLB,,, | Performed by: OBSTETRICS & GYNECOLOGY

## 2023-06-29 NOTE — PROCEDURES
COLPOSCOPY:    Shantell Aguilar is a 52 y.o. 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  She is postmenopausal, not on hormones.  History of LEEP more than 20 years ago.    Summary:  7/12/22 Pap: Negative, other HR HPV present  6/1/23 Pap: Negative, other HR HPV present    UPT today is negative.        PRE-COLPOSCOPY PROCEDURE COUNSELING:  Discussed the abnormal pap test findings, HPV, need for colposcopy and possible biopsies to determine a diagnosis and plan of care, treatments available, the minimal risks of bleeding and infection with a colposcopy, alternatives to colposcopy and she agrees to proceed.  Patient was given the opportunity to ask questions and written consent was obtained.        COLPOSCOPY EXAM:   TIME OUT PERFORMED.   Cervix visualized with speculum  Acetic acid applied to cervix  Cervical os stenotic- blue os finder used.  Entire transformation zone seen  Minimal faint white epithelium noted at 7:00  Biopsy was taken at 7:00  ECC performed.    Hemostatic with silver nitrate.  Minimal blood loss.      The speculum was removed.   The patient tolerated the procedure well.    There were no complications.      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  Avoid anything in vagina (intercourse, douching, tampons) one week after the procedure.  Report bleeding heavier than a period, worsening pain, fever > 101.0 F, or foul-smelling vaginal discharge.  Importance of follow-up stressed.      FOLLOW-UP:   We will contact her with biopsy results and to discuss treatment plan.

## 2023-07-07 ENCOUNTER — TELEPHONE (OUTPATIENT)
Dept: OBSTETRICS AND GYNECOLOGY | Facility: CLINIC | Age: 53
End: 2023-07-07
Payer: COMMERCIAL

## 2023-07-07 LAB
FINAL PATHOLOGIC DIAGNOSIS: NORMAL
GROSS: NORMAL
Lab: NORMAL
MICROSCOPIC EXAM: NORMAL

## 2023-07-07 NOTE — TELEPHONE ENCOUNTER
----- Message from Iker Brooke sent at 7/7/2023  4:16 PM CDT -----  Name of Who is Calling:VITOR VALENTINO [[id number]]           What is the request in detail:pt stated that she would like to speak with the office directly in regards to her questions/concerns.Please contact to further discuss and advise.            Can the clinic reply by [facility]:[phone]           What Number to Call Back if not in [facility]:[phone]

## 2023-07-07 NOTE — TELEPHONE ENCOUNTER
Called patient:    Discussed results of colpo:  CAROLYN 1    1.  Cervix at 7, biopsy:   Focal mild squamous dysplasia (CIN1/LSIL)   Transformation zone is not present     2. Endocervical curettage:   Nondiagnostic   Specimen processed and examined, but nondiagnostic as no tissue is present for microscopic evaluation (see gross description)    REC:  Repeat pap in one year - I emphasized the importance of follow-up.

## 2024-02-12 ENCOUNTER — HOSPITAL ENCOUNTER (OUTPATIENT)
Dept: RADIOLOGY | Facility: OTHER | Age: 54
Discharge: HOME | End: 2024-02-12
Attending: OBSTETRICS & GYNECOLOGY
Payer: COMMERCIAL

## 2024-02-12 VITALS — WEIGHT: 110.25 LBS | BODY MASS INDEX: 17.72 KG/M2 | HEIGHT: 66 IN

## 2024-02-12 DIAGNOSIS — Z12.31 ENCOUNTER FOR SCREENING MAMMOGRAM FOR BREAST CANCER: ICD-10-CM

## 2024-02-12 PROCEDURE — 77067 SCR MAMMO BI INCL CAD: CPT | Mod: TC

## 2024-02-12 PROCEDURE — 77063 BREAST TOMOSYNTHESIS BI: CPT | Mod: 26,,, | Performed by: RADIOLOGY

## 2024-02-12 PROCEDURE — 77067 SCR MAMMO BI INCL CAD: CPT | Mod: 26,,, | Performed by: RADIOLOGY

## 2024-02-20 ENCOUNTER — TELEPHONE (OUTPATIENT)
Dept: OBSTETRICS AND GYNECOLOGY | Facility: CLINIC | Age: 54
End: 2024-02-20
Payer: COMMERCIAL

## 2024-02-20 ENCOUNTER — HOSPITAL ENCOUNTER (EMERGENCY)
Facility: OTHER | Age: 54
Discharge: HOME | End: 2024-02-20
Attending: EMERGENCY MEDICINE
Payer: COMMERCIAL

## 2024-02-20 VITALS
WEIGHT: 110 LBS | RESPIRATION RATE: 18 BRPM | BODY MASS INDEX: 17.68 KG/M2 | HEIGHT: 66 IN | OXYGEN SATURATION: 100 % | TEMPERATURE: 98 F | HEART RATE: 60 BPM | SYSTOLIC BLOOD PRESSURE: 110 MMHG | DIASTOLIC BLOOD PRESSURE: 65 MMHG
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
## 2024-02-20 DIAGNOSIS — M25.512 LEFT SHOULDER PAIN: Primary | ICD-10-CM

## 2024-02-20 PROCEDURE — 96374 THER/PROPH/DIAG INJ IV PUSH: CPT

## 2024-02-20 PROCEDURE — 63600175 PHARM REV CODE 636 W HCPCS

## 2024-02-20 PROCEDURE — 99285 EMERGENCY DEPT VISIT HI MDM: CPT | Mod: 25

## 2024-02-20 RX ORDER — KETOROLAC TROMETHAMINE 30 MG/ML
15 INJECTION, SOLUTION INTRAMUSCULAR; INTRAVENOUS
Status: COMPLETED | OUTPATIENT
Start: 2024-02-20 | End: 2024-02-20

## 2024-02-20 RX ORDER — MELOXICAM 15 MG/1
15 TABLET ORAL DAILY
Qty: 20 TABLET | Refills: 0 | Status: SHIPPED | OUTPATIENT
Start: 2024-02-20 | End: 2024-04-26

## 2024-02-20 RX ADMIN — KETOROLAC TROMETHAMINE 15 MG: 30 INJECTION, SOLUTION INTRAMUSCULAR; INTRAVENOUS at 09:02

## 2024-02-20 NOTE — TELEPHONE ENCOUNTER
Called patient:    Discussed results of recent mammogram:    Impression:   No mammographic evidence of malignancy.  BI-RADS Category 1: Negative  Recommendation:  Routine screening mammogram in 1 year is recommended.    Your estimated lifetime risk of breast cancer (to age 85) based on Tyrer-Cuzick risk assessment model is 21.03 %.  According to the [employer] Society, patients with a lifetime breast cancer risk of 20% or higher might benefit from supplemental screening tests, such as screening breast MRI.    We reviewed the Tyrer-Cuzick risk scale, her risk of 21.03%, and option of additional counseling and testing at [facility].  She will consider and let us know.

## 2024-02-21 ENCOUNTER — TELEPHONE (OUTPATIENT)
Dept: ORTHOPEDICS | Facility: CLINIC | Age: 54
End: 2024-02-21
Payer: COMMERCIAL

## 2024-02-21 NOTE — ED PROVIDER NOTES
"Encounter Date: 2/20/2024       History     Chief Complaint   Patient presents with    Fall     Here via [facility] with c/o let shoulder pain and head pain after walking a dog on a leash and the dog began to run, pt states "everything went black" when she hit her head, given 50 mcg Fentantyl en route     This is 53-year-old female who presents to the ED via EMS with complaints of left shoulder pain after mechanical fall that happened just prior to arrival.  Patient states that she was walking her dog in bent down to  it's feces when the dog started running away, pulling the patient and dragging her into the street.  She also complains of mild right-sided neck pain and headache.  She did not lose consciousness, but states that "everything went black." She has no amnesia of the event.  She received 50 mcg fentanyl via EMS prior to arrival.  Describes the pain as aching and worsens with movement of her left arm, and located to the back of her shoulder.  Denies vision changes, shortness of breath, chest pain, weakness, numbness or tingling    The history is provided by the patient and the EMS personnel.     Review of patient's allergies indicates:   Allergen Reactions    Demerol [meperidine] Other (See Comments)     "I had it before a sedation and I didn't wake up when I was supposed to.  I was overly sedated."    Penicillins      Past Medical History:   Diagnosis Date    Abnormal Pap smear of cervix 2000    dysplasia - Conization?    Foot fracture, left     "stress fracture"    Frequent UTI      Past Surgical History:   Procedure Laterality Date    BREAST BIOPSY      CERVICAL BIOPSY  W/ LOOP ELECTRODE EXCISION       Family History   Problem Relation Age of Onset    Breast cancer Mother 35    Cancer Maternal Grandmother     Colon cancer Maternal Grandmother     Ovarian cancer Neg Hx      Social History     Tobacco Use    Smoking status: Never    Smokeless tobacco: Never   Substance Use Topics    Alcohol use: No    " Drug use: No     Review of Systems  As per HPI above  Physical Exam     Initial Vitals [02/20/24 2016]   BP Pulse Resp Temp SpO2   100/60 (!) 52 18 97.8 °F (36.6 °C) 100 %      MAP       --         Physical Exam    Constitutional: She appears well-developed and well-nourished.  Non-toxic appearance. She does not appear ill. No distress.   HENT:   Head: Normocephalic and atraumatic.   Eyes: Conjunctivae are normal.   Neck: Neck supple.   Cardiovascular:  Normal rate and regular rhythm.           Pulmonary/Chest: Effort normal. No tachypnea. No respiratory distress.   Musculoskeletal:      Right shoulder: Tenderness (Posterior glenohumeral head) present. No swelling, deformity, effusion or crepitus. Decreased range of motion (Secondary to pain). Normal strength. Normal pulse.      Cervical back: Neck supple.      Comments: No C, T, L midline spinal tenderness.  Right cervical paraspinal tenderness.  Full active and passive ROM of neck without pain.     Neurological: She is alert and oriented to person, place, and time. She has normal strength. No sensory deficit.   Skin: Skin is warm, dry and intact.   Psychiatric: She has a normal mood and affect. Her speech is normal and behavior is normal.         ED Course   Procedures  Labs Reviewed - No data to display       Imaging Results              X-Ray Shoulder 2 or More Views Left (Final result)  Result time 02/20/24 21:47:23   Procedure changed from X-Ray Shoulder Trauma Left     Final result by Ravi Velazco MD (02/20/24 21:47:23)                   Impression:      No acute osseous abnormality identified.      Electronically signed by: Ravi Velazco MD  Date:    02/20/2024  Time:    21:47               Narrative:    EXAMINATION:  XR SHOULDER COMPLETE 2 OR MORE VIEWS LEFT    CLINICAL HISTORY:  fall; Pain in left shoulder    TECHNIQUE:  Two views of the left shoulder were performed.    COMPARISON  December 2021.    FINDINGS:  No evidence of acute displaced  fracture, dislocation, or osseous destructive process.                                       CT Cervical Spine Without Contrast (Final result)  Result time 02/20/24 21:49:49      Final result by Ravi Velazco MD (02/20/24 21:49:49)                   Impression:      No evidence of acute cervical spine fracture or dislocation.      Electronically signed by: Ravi Velazco MD  Date:    02/20/2024  Time:    21:49               Narrative:    EXAMINATION:  CT CERVICAL SPINE WITHOUT CONTRAST    CLINICAL HISTORY:  Polytrauma, blunt;    TECHNIQUE:  Low dose axial images, sagittal and coronal reformations were performed though the cervical spine.  Contrast was not administered.    COMPARISON:  None    FINDINGS:  No evidence of acute cervical spine fracture or dislocation.  Odontoid process is intact.  Craniocervical junction is unremarkable.  There is straightening of the normal cervical lordosis.  Minor degenerative changes are seen with facet arthropathy on the left at the C4-5 level.    Surrounding soft tissues show no significant abnormalities.  Airway is patent.  Partially visualized lung apices are clear.                                       CT Head Without Contrast (Final result)  Result time 02/20/24 21:46:48      Final result by Ravi Velazco MD (02/20/24 21:46:48)                   Impression:      No acute intracranial abnormalities identified.      Electronically signed by: Ravi Velazco MD  Date:    02/20/2024  Time:    21:46               Narrative:    EXAMINATION:  CT HEAD WITHOUT CONTRAST    CLINICAL HISTORY:  Polytrauma, blunt;    TECHNIQUE:  Low dose axial images were obtained through the head.  Coronal and sagittal reformations were also performed. Contrast was not administered.    COMPARISON:  None.    FINDINGS:  No evidence of acute/recent major vascular distribution cerebral infarction, intraparenchymal hemorrhage, or intra-axial space occupying lesion. The ventricular system is normal in  size and configuration with no evidence of hydrocephalus. No effacement of the skull-base cisterns. No abnormal extra-axial fluid collections or blood products. Visualized paranasal sinuses and mastoid air cells are clear. The calvarium shows no significant abnormality.                                       Medications   ketorolac injection 15 mg (15 mg Intravenous Given 2/20/24 2111)     Medical Decision Making  Urgent evaluation of an afebrile 53-year-old female with left shoulder and neck pain after mechanical fall that happened just prior to arrival.  Patient received 50 mcg fentanyl prior to arrival.  Physical exam reveals point tenderness to the posterior glenohumeral head with no obvious deformity.  Pain with full abduction the arm, but normal passive ROM.  No focal neurological deficits.  Aggressively palpated midline spine and chest wall and no focal tenderness.  No skin breakdown or abrasions.  Will obtain imaging, treat pain, and reassess.    Differential Diagnosis includes, but is not limited to:  Muscle strain, ligament tear/sprain, soft tissue contusion, fracture, dislocation    Amount and/or Complexity of Data Reviewed  Radiology: ordered.    Risk  Prescription drug management.               ED Course as of 02/20/24 2224 Tue Feb 20, 2024 2153 CT Cervical Spine Without Contrast  No evidence of acute cervical spine fracture or dislocation [JOVANY]   2153 CT Head Without Contrast  No acute intracranial abnormalities identified. [JOVANY]   2153 X-Ray Shoulder 2 or More Views Left  No acute osseous abnormality identified [JOVANY]   2201 Patient states her pain is a 2/10 while not moving.  Does have worsening pain of her left shoulder when moving her arm.  No dislocation or fracture.  Possible underlying ligament injury and will refer to orthopedics for further workup.  Will give sling for comfort, but patient was instructed to only use for a couple of days and to try to move her shoulder as tolerated.  Will  discharge with anti-inflammatory as well.Patient educated on signs and symptoms to monitor for and when to return to ED. Patient verbalized understanding and agrees with treatment plan. All questions and concerns addressed.  [JOVANY]      ED Course User Index  [JOVANY] Mery Natarajan PA-C                     Clinical Impression:  Final diagnoses:  [M25.512] Left shoulder pain (Primary)  [W19.XXXA] Fall, initial encounter          ED Disposition Condition    Discharge Stable          ED Prescriptions       Medication Sig Dispense Start Date End Date Auth. Provider    meloxicam (MOBIC) 15 MG tablet Take 1 tablet (15 mg total) by mouth once daily. 20 tablet 2/20/2024 -- Mery Natarajan PA-C          Follow-up Information       Follow up With Specialties Details Why Contact Info    Specialists, [facility] Orthopaedic Orthopedic Surgery Schedule an appointment as soon as possible for a visit   [address] [id number]  [phone]      [facility], [place], operated by [facility] Emergency Dept Emergency Medicine Go to  If symptoms worsen [address] [id number] [phone]             Mery Natarajan PA-C  02/20/24 [id number]

## 2024-03-14 ENCOUNTER — OFFICE VISIT (OUTPATIENT)
Dept: URGENT CARE | Facility: CLINIC | Age: 54
End: 2024-03-14
Payer: COMMERCIAL

## 2024-03-14 VITALS
HEART RATE: 74 BPM | RESPIRATION RATE: 20 BRPM | DIASTOLIC BLOOD PRESSURE: 68 MMHG | SYSTOLIC BLOOD PRESSURE: 117 MMHG | TEMPERATURE: 98 F | WEIGHT: 110 LBS | OXYGEN SATURATION: 98 % | HEIGHT: 66 IN | BODY MASS INDEX: 17.68 KG/M2

## 2024-03-14 DIAGNOSIS — R30.0 DYSURIA: ICD-10-CM

## 2024-03-14 DIAGNOSIS — N30.01 ACUTE CYSTITIS WITH HEMATURIA: Primary | ICD-10-CM

## 2024-03-14 LAB
BILIRUB UR QL STRIP: NEGATIVE
GLUCOSE UR QL STRIP: NEGATIVE
KETONES UR QL STRIP: NEGATIVE
LEUKOCYTE ESTERASE UR QL STRIP: POSITIVE
PH, POC UA: 5.5 (ref 5–8)
POC BLOOD, URINE: POSITIVE
POC NITRATES, URINE: NEGATIVE
PROT UR QL STRIP: POSITIVE
SP GR UR STRIP: 1.01 (ref 1–1.03)
UROBILINOGEN UR STRIP-ACNC: NORMAL (ref 0.1–1.1)

## 2024-03-14 PROCEDURE — 81003 URINALYSIS AUTO W/O SCOPE: CPT | Mod: QW,S$GLB,, | Performed by: NURSE PRACTITIONER

## 2024-03-14 PROCEDURE — 99203 OFFICE O/P NEW LOW 30 MIN: CPT | Mod: S$GLB,,, | Performed by: NURSE PRACTITIONER

## 2024-03-14 RX ORDER — NITROFURANTOIN 25; 75 MG/1; MG/1
100 CAPSULE ORAL 2 TIMES DAILY
Qty: 14 CAPSULE | Refills: 0 | Status: SHIPPED | OUTPATIENT
Start: 2024-03-14 | End: 2024-03-21

## 2024-03-14 NOTE — PROGRESS NOTES
"Subjective:      Patient ID: Shantell Aguilar is a 53 y.o. female.    Vitals:  height is 5' 6" (1.676 m) and weight is 49.9 kg (110 lb). Her temperature is 98.4 °F (36.9 °C). Her blood pressure is 117/68 and her pulse is 74. Her respiration is 20 and oxygen saturation is 98%.     Chief Complaint: Dysuria      Pt is a 52 yo female presenting with dysuria, frequency, urgency, and hematuria.  Onset of symptoms was today.  Pt denies fever, vaginal discharge, flank pain, or abdominal pain.      Dysuria   This is a new problem. The current episode started acute onset. The problem occurs every urination. The problem has been unchanged. The quality of the pain is described as burning. The pain is at a severity of 3/10. The pain is moderate. There has been no fever. She is Sexually active. There is No history of pyelonephritis. Associated symptoms include frequency, hematuria and urgency. Pertinent negatives include no chills, flank pain, nausea or vomiting. She has tried nothing for the symptoms. The treatment provided no relief.       Constitution: Negative for chills, fatigue and fever.   Cardiovascular:  Negative for chest pain.   Respiratory:  Negative for shortness of breath.    Gastrointestinal:  Negative for nausea, vomiting and diarrhea.   Genitourinary:  Positive for dysuria, frequency, urgency and hematuria. Negative for flank pain, vaginal discharge and vaginal odor.   Neurological:  Negative for headaches.      Objective:     Physical Exam   Constitutional: She is oriented to person, place, and time.   Cardiovascular: Normal rate and regular rhythm.   Pulmonary/Chest: Effort normal and breath sounds normal.   Abdominal: Bowel sounds are normal. Soft. flat abdomen There is no abdominal tenderness. There is no left CVA tenderness and no right CVA tenderness.   Neurological: She is alert and oriented to person, place, and time.   Skin: Skin is warm and dry.   Vitals reviewed.      Assessment:     1. Acute " cystitis with hematuria    2. Dysuria        Plan:       Acute cystitis with hematuria  -     nitrofurantoin, macrocrystal-monohydrate, (MACROBID) 100 MG capsule; Take 1 capsule (100 mg total) by mouth 2 (two) times daily. for 7 days  Dispense: 14 capsule; Refill: 0    Dysuria  -     POCT Urinalysis, Dipstick, Automated, W/O Scope      Patient Instructions       Dysuria  -     POCT Urinalysis, Dipstick, Automated, W/O Scope    Acute cystitis with hematuria    -     nitrofurantoin, macrocrystal-monohydrate, (MACROBID) 100 MG capsule; Take 1 capsule (100 mg total) by mouth 2 (two) times daily. for 7 days  Dispense: 14 capsule; Refill: 0      UTI (Urinary Tract Infection)    Cranberry juice may help. Get the 100% cranberry juice and mix 4 oz of juice with 4 oz of water and drink this 8 oz glass of liquid once a day.     Increase water intake to at least 8-10 glasses/day.    Avoid caffeine, alcohol, or spicy foods as they irritate the bladder.      If you take OTC AZO/Pyridium/Phenazopyridine, follow instructions as directed.  Do NOT take for more than 2 days.  Do not wear contacts with Pyridium as it will stain them.  You may want to wear a panty liner with Pyridium as it may stain your underwear.    If you are are female and on birth control pills use additional methods (such as condom use) to prevent pregnancy while on the antibiotics and for one cycle after.     If your condition worsens or fails to improve we recommend that you receive another evaluation at the ER immediately or contact your PCP to discuss your concerns or return here.

## 2024-03-14 NOTE — PATIENT INSTRUCTIONS
Dysuria  -     POCT Urinalysis, Dipstick, Automated, W/O Scope    Acute cystitis with hematuria    -     nitrofurantoin, macrocrystal-monohydrate, (MACROBID) 100 MG capsule; Take 1 capsule (100 mg total) by mouth 2 (two) times daily. for 7 days  Dispense: 14 capsule; Refill: 0      UTI (Urinary Tract Infection)    Cranberry juice may help. Get the 100% cranberry juice and mix 4 oz of juice with 4 oz of water and drink this 8 oz glass of liquid once a day.     Increase water intake to at least 8-10 glasses/day.    Avoid caffeine, alcohol, or spicy foods as they irritate the bladder.      If you take OTC AZO/Pyridium/Phenazopyridine, follow instructions as directed.  Do NOT take for more than 2 days.  Do not wear contacts with Pyridium as it will stain them.  You may want to wear a panty liner with Pyridium as it may stain your underwear.    If you are are female and on birth control pills use additional methods (such as condom use) to prevent pregnancy while on the antibiotics and for one cycle after.     If your condition worsens or fails to improve we recommend that you receive another evaluation at the ER immediately or contact your PCP to discuss your concerns or return here.

## 2024-04-26 ENCOUNTER — HOSPITAL ENCOUNTER (OUTPATIENT)
Dept: RADIOLOGY | Facility: HOSPITAL | Age: 54
Discharge: HOME | End: 2024-04-26
Attending: PHYSICIAN ASSISTANT
Payer: COMMERCIAL

## 2024-04-26 ENCOUNTER — OFFICE VISIT (OUTPATIENT)
Dept: SPORTS MEDICINE | Facility: CLINIC | Age: 54
End: 2024-04-26
Payer: COMMERCIAL

## 2024-04-26 VITALS
DIASTOLIC BLOOD PRESSURE: 61 MMHG | BODY MASS INDEX: 17.58 KG/M2 | WEIGHT: 108.94 LBS | HEART RATE: 74 BPM | SYSTOLIC BLOOD PRESSURE: 107 MMHG

## 2024-04-26 DIAGNOSIS — M25.561 ACUTE PAIN OF RIGHT KNEE: ICD-10-CM

## 2024-04-26 DIAGNOSIS — G89.11 ACUTE PAIN OF LEFT SHOULDER DUE TO TRAUMA: ICD-10-CM

## 2024-04-26 DIAGNOSIS — M25.512 LEFT SHOULDER PAIN: ICD-10-CM

## 2024-04-26 DIAGNOSIS — M25.512 ACUTE PAIN OF LEFT SHOULDER DUE TO TRAUMA: ICD-10-CM

## 2024-04-26 DIAGNOSIS — M25.512 ACUTE PAIN OF LEFT SHOULDER: Primary | ICD-10-CM

## 2024-04-26 DIAGNOSIS — S80.02XA CONTUSION OF LEFT KNEE, INITIAL ENCOUNTER: ICD-10-CM

## 2024-04-26 PROCEDURE — 3074F SYST BP LT 130 MM HG: CPT | Mod: CPTII,S$GLB,, | Performed by: PHYSICIAN ASSISTANT

## 2024-04-26 PROCEDURE — 1159F MED LIST DOCD IN RCRD: CPT | Mod: CPTII,S$GLB,, | Performed by: PHYSICIAN ASSISTANT

## 2024-04-26 PROCEDURE — 99999 PR PBB SHADOW E&M-EST. PATIENT-LVL III: CPT | Mod: PBBFAC,,, | Performed by: PHYSICIAN ASSISTANT

## 2024-04-26 PROCEDURE — 99215 OFFICE O/P EST HI 40 MIN: CPT | Mod: S$GLB,,, | Performed by: PHYSICIAN ASSISTANT

## 2024-04-26 PROCEDURE — 73564 X-RAY EXAM KNEE 4 OR MORE: CPT | Mod: TC,50

## 2024-04-26 PROCEDURE — 73030 X-RAY EXAM OF SHOULDER: CPT | Mod: 26,LT,, | Performed by: RADIOLOGY

## 2024-04-26 PROCEDURE — 73030 X-RAY EXAM OF SHOULDER: CPT | Mod: TC,LT

## 2024-04-26 PROCEDURE — 1160F RVW MEDS BY RX/DR IN RCRD: CPT | Mod: CPTII,S$GLB,, | Performed by: PHYSICIAN ASSISTANT

## 2024-04-26 PROCEDURE — 3078F DIAST BP <80 MM HG: CPT | Mod: CPTII,S$GLB,, | Performed by: PHYSICIAN ASSISTANT

## 2024-04-26 PROCEDURE — 73564 X-RAY EXAM KNEE 4 OR MORE: CPT | Mod: 26,,, | Performed by: RADIOLOGY

## 2024-04-26 PROCEDURE — 3008F BODY MASS INDEX DOCD: CPT | Mod: CPTII,S$GLB,, | Performed by: PHYSICIAN ASSISTANT

## 2024-04-26 NOTE — PROGRESS NOTES
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    RHD.    Pain began on 2/20/24 when she was walking a friends dog and pulled down unexpectedly by the dog and drug into the street. Her left arm was caught in the lease and she was pulled down. Since then her shoulder has been really hurting her. She was evaluated in the ER and sent here. She initially wore a sling.     NSAIDs, tylenol, ice and rest are not improving her pain.     She reports that the pain is worse with overhead activity. It also bothers her at night.    SANE 60      PAST MEDICAL HISTORY:   Past Medical History:   Diagnosis Date    Abnormal Pap smear of cervix 2000    dysplasia - Conization?    Foot fracture, left     "stress fracture"    Frequent UTI      PAST SURGICAL HISTORY:   Past Surgical History:   Procedure Laterality Date    BREAST BIOPSY      CERVICAL BIOPSY  W/ LOOP ELECTRODE EXCISION       FAMILY HISTORY:   Family History   Problem Relation Name Age of Onset    Breast cancer Mother  35    Cancer Maternal Grandmother      Colon cancer Maternal Grandmother      Ovarian cancer Neg Hx       SOCIAL HISTORY:   Social History     Socioeconomic History    Marital status: Single   Tobacco Use    Smoking status: Never    Smokeless tobacco: Never   Substance and Sexual Activity    Alcohol use: No    Drug use: No    Sexual activity: Not Currently     Partners: Male     Birth control/protection: None     Social Determinants of Health     Financial Resource Strain: Low Risk  (4/25/2024)    Overall Financial Resource Strain (CARDIA)     Difficulty of Paying Living Expenses: Not very hard   Food Insecurity: No Food Insecurity (4/25/2024)    Hunger Vital Sign     Worried About Running Out of Food in the Last Year: Never true     Ran Out of Food in the Last Year: Never true   Transportation Needs: No Transportation Needs (4/25/2024)    PRAPARE - Transportation     " "Lack of Transportation (Medical): No     Lack of Transportation (Non-Medical): No   Physical Activity: Sufficiently Active (4/25/2024)    Exercise Vital Sign     Days of Exercise per Week: 6 days     Minutes of Exercise per Session: 30 min   Stress: No Stress Concern Present (4/25/2024)    [employer] [place] - Occupational Stress Questionnaire     Feeling of Stress : Only a little   Social Connections: Unknown (4/25/2024)    Social Connection and Isolation Panel [NHANES]     Frequency of Communication with Friends and Family: More than three times a week     Frequency of Social Gatherings with Friends and Family: Once a week     Active Member of Clubs or Organizations: Yes     Attends Club or Organization Meetings: More than 4 times per year     Marital Status:    Housing Stability: Unknown (4/25/2024)    Housing Stability Vital Sign     Unable to Pay for Housing in the Last Year: No       MEDICATIONS: No current outpatient medications on file.  ALLERGIES:   Review of patient's allergies indicates:   Allergen Reactions    Demerol [meperidine] Other (See Comments)     "I had it before a sedation and I didn't wake up when I was supposed to.  I was overly sedated."    Penicillins        VITAL SIGNS: /61   Pulse 74   Wt 49.4 kg (108 lb 14.5 oz)   LMP 07/15/2016   BMI 17.58 kg/m²      Review of Systems   Constitution: Negative. Negative for chills, fever and night sweats.   HENT: Negative for congestion and headaches.    Eyes: Negative for blurred vision, left vision loss and right vision loss.   Cardiovascular: Negative for chest pain and syncope.   Respiratory: Negative for cough and shortness of breath.    Endocrine: Negative for polydipsia, polyphagia and polyuria.   Hematologic/Lymphatic: Negative for bleeding problem. Does not bruise/bleed easily.   Skin: Negative for dry skin, itching and rash.   Musculoskeletal: Negative for falls and muscle weakness.   Gastrointestinal: " Negative for abdominal pain and bowel incontinence.   Genitourinary: Negative for bladder incontinence and nocturia.   Neurological: Negative for disturbances in coordination, loss of balance and seizures.   Psychiatric/Behavioral: Negative for depression. The patient does not have insomnia.    Allergic/Immunologic: Negative for hives and persistent infections.       PHYSICAL EXAMINATION:  General:  The patient is alert and oriented x 3.  Mood is pleasant.  Observation of ears, eyes and nose reveal no gross abnormalities.  HEENT: NCAT, sclera nonicteric  Lungs: Respirations are equal and unlabored.  Gait is coordinated. Patient can toe walk and heel walk without difficulty.  Cardiovascular: There are no swelling or varicosities present.   Lymphatic: Negative for adenopathy       left SHOULDER / UPPER EXTREMITY EXAM    OBSERVATION:     Swelling  none  Deformity  none   Discoloration  none   Scapular winging none   Scars   none  Atrophy  none    TENDERNESS / CREPITUS (T/C):          T/C      T/C   Clavicle   -/-  SUPRAspinatus    -/-   AC Jt.    -/-  INFRAspinatus  -/-   SC Jt.    -/-  Deltoid    -/-   G. Tuberosity  -/-  LH BICEP groove  +/-   Acromion:  -/-  Midline Neck   -/-   Scapular Spine -/-  Trapezium   -/-   SMA Scapula  -/-  GH jt. line - post  -/-   Scapulothoracic  -/-         ROM: (* = with pain)  Right shoulder   Left shoulder        AROM (PROM)   AROM (PROM)   FE    170° (175°)     170° (175°)     ER at 0°    60°  (65°)    60°  (65°)   ER at 90° ABD  90°  (90°)    45°  (65°)*   IR at 90°  ABD   NA  (40°)     NA  (40°)      IR (spine level)   T10     L4*    STRENGTH: (* = with pain) RIGHT SHOULDER  LEFT SHOULDER   SCAPTION at 0   5/5    4/5*    SCAPTION at 30   5/5    5-/5*    IR    5/5    5/5   ER    5/5    4+/5*   BICEPS   5/5    5/5   Deltoid    5/5    5/5    Bear hug test 5-/5 on left and 5/5 on right.      SIGNS:  Painful side       NEER   +    OJOSES  Neg   CULVER   +    SPEEDS  +   DROP  ARM   neg   BELLY PRESS Neg   Superior escape none    LIFT-OFF  Neg   X-Body ADD    neg    MOVING VALGUS Neg      STABILITY TESTING    RIGHT SHOULDER   LEFT SHOULDER       Translation    Anterior  up face     up face    Posterior  up face    up face    Sulcus   < 10mm    < 10 mm    Signs    Apprehension   neg      Neg    Relocation   no change     no change    Jerk test  neg     Neg      EXTREMITY NEURO-VASCULAR EXAM    Sensation grossly intact to light touch all dermatomal regions.    DTR 2+ Biceps, Triceps, BR and Negative Katies sign   Grossly intact motor function at Elbow, Wrist and Hand   Distal pulses radial and ulnar 2+, brisk cap refill, symmetric.      NECK:  Painless FROM and spinous processes non-tender. Negative Spurlings sign.      OTHER FINDINGS:    XRAYS:  Shoulder trauma series left,  were ordered and reviewed and interpreted by me. No convincing fracture or dislocation is noted. The osseous structures appear well mineralized and well aligned, possible insufficiency fracture greater tuberosity/cystic changes, +AC moderate hypertrophy, degenerative changes    left   1. Shoulder pain,possible RTC tear, complicated   2.  Possible insufficiency fracture greater tuberosity    Plan:       ASSESSMENT:  shoulder pain.    I do think that this is likely a rotator cuff tear, possible insufficiency fracture greater tuberosity.    I have recommended we check an MRI of the shoulder to evaluate this.    Depending on the results of the MRI, we may consider a cortisone   injection and physical therapy and/or arthroscopic intervention and treatment   depending on what we find.  I will see her back upon its completion or PHREV and we will consider the above.    Continue ice and NSAIDs for pain.       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    Pain began on 2/20/24 when she was walking a friends dog and pulled down unexpectedly by the  "dog and drug into the street. Her left arm was caught in the lease and she was pulled down.Her right knee hit the ground and was hurting her after. Her knee initially got better and pain was minimal. Over the last 1-2 days her knee pain has worsened and she is now having trouble walking on the leg due to pain. She has been using ice compresses and NSAIDs with some improvement.     She reports knee was a little swollen after the injury but did not get swollen again here recently.     She reports that the pain is worse with steps.  It also bothers her at night.    Is affecting ADLs.     No mechanical symptoms, no instability    Review of Systems   Constitution: Negative. Negative for chills, fever and night sweats.   HENT: Negative for congestion and headaches.    Eyes: Negative for blurred vision, left vision loss and right vision loss.   Cardiovascular: Negative for chest pain and syncope.   Respiratory: Negative for cough and shortness of breath.    Endocrine: Negative for polydipsia, polyphagia and polyuria.   Hematologic/Lymphatic: Negative for bleeding problem. Does not bruise/bleed easily.   Skin: Negative for dry skin, itching and rash.   Musculoskeletal: Negative for falls and muscle weakness.   Gastrointestinal: Negative for abdominal pain and bowel incontinence.   Genitourinary: Negative for bladder incontinence and nocturia.   Neurological: Negative for disturbances in coordination, loss of balance and seizures.   Psychiatric/Behavioral: Negative for depression. The patient does not have insomnia.    Allergic/Immunologic: Negative for hives and persistent infections.   All other systems negative      PAST MEDICAL HISTORY:   Past Medical History:   Diagnosis Date    Abnormal Pap smear of cervix 2000    dysplasia - Conization?    Foot fracture, left     "stress fracture"    Frequent UTI      PAST SURGICAL HISTORY:   Past Surgical History:   Procedure Laterality Date    BREAST BIOPSY      CERVICAL BIOPSY  W/ " LOOP ELECTRODE EXCISION       FAMILY HISTORY:   Family History   Problem Relation Name Age of Onset    Breast cancer Mother  35    Cancer Maternal Grandmother      Colon cancer Maternal Grandmother      Ovarian cancer Neg Hx       SOCIAL HISTORY:   Social History     Socioeconomic History    Marital status: Single   Tobacco Use    Smoking status: Never    Smokeless tobacco: Never   Substance and Sexual Activity    Alcohol use: No    Drug use: No    Sexual activity: Not Currently     Partners: Male     Birth control/protection: None     Social Determinants of Health     Financial Resource Strain: Low Risk  (4/25/2024)    Overall Financial Resource Strain (CARDIA)     Difficulty of Paying Living Expenses: Not very hard   Food Insecurity: No Food Insecurity (4/25/2024)    Hunger Vital Sign     Worried About Running Out of Food in the Last Year: Never true     Ran Out of Food in the Last Year: Never true   Transportation Needs: No Transportation Needs (4/25/2024)    PRAPARE - Transportation     Lack of Transportation (Medical): No     Lack of Transportation (Non-Medical): No   Physical Activity: Sufficiently Active (4/25/2024)    Exercise Vital Sign     Days of Exercise per Week: 6 days     Minutes of Exercise per Session: 30 min   Stress: No Stress Concern Present (4/25/2024)    [employer] - Occupational Stress Questionnaire     Feeling of Stress : Only a little   Social Connections: Unknown (4/25/2024)    Social Connection and Isolation Panel [NHANES]     Frequency of Communication with Friends and Family: More than three times a week     Frequency of Social Gatherings with Friends and Family: Once a week     Active Member of Clubs or Organizations: Yes     Attends Club or Organization Meetings: More than 4 times per year     Marital Status:    Housing Stability: Unknown (4/25/2024)    Housing Stability Vital Sign     Unable to Pay for Housing in the Last Year: No  "      MEDICATIONS: No current outpatient medications on file.  ALLERGIES:   Review of patient's allergies indicates:   Allergen Reactions    Demerol [meperidine] Other (See Comments)     "I had it before a sedation and I didn't wake up when I was supposed to.  I was overly sedated."    Penicillins        VITAL SIGNS: /61   Pulse 74   Wt 49.4 kg (108 lb 14.5 oz)   LMP 07/15/2016   BMI 17.58 kg/m²      PHYSICAL EXAMINATION    General:  The patient is alert and oriented x 3.  Mood is pleasant.  Observation of ears, eyes and nose reveal no gross abnormalities.  HEENT: NCAT, sclera nonicteric  Lungs: Respirations are equal and unlabored.    left  KNEE EXAMINATION     OBSERVATION / INSPECTION   Gait:   Nonantalgic   Alignment:  Neutral   Scars:   None   Muscle atrophy: Mild  Effusion:  None   Warmth:  None   Discoloration:   none     TENDERNESS / CREPITUS (T / C):          T / C      T / C   Patella   + / -   Lateral joint line   - / -      Peripatellar medial  -  Medial joint line    - / -   Peripatellar lateral -  Medial plica   - / -   Patellar tendon +   Popliteal fossa  - / -   Quad tendon   +   Gastrocnemius   -   Prepatellar Bursa - / -   Quadricep   -   Tibial tubercle  -  Thigh/hamstring  -   Pes anserine/HS -  Fibula    -   ITB   - / -  Tibia     -   Tib/fib joint  - / -  LCL    -     MFC   - / -   MCL: Proximal  -    LFC   - / -    Distal   -          ROM: (* = pain)  PASSIVE   ACTIVE    Left :   5 / 0 / 145   5 / 0 / 145     Right :    5 / 0 / 145   5 / 0 / 145    Patellofemoral examination:  See above noted areas of tenderness.   Patella position    Subluxation / dislocation: Centered           Sup. / Inf;   Normal   Crepitus (PF):    Absent   Patellar Mobility:       Medial-lateral:   Normal    Superior-inferior:  Normal    Inferior tilt   Normal    Patellar tendon:  Normal   Lateral tilt:    Normal   J-sign:     None   Patellofemoral grind:   +       MENISCAL SIGNS:     Pain on terminal " extension:  -  Pain on terminal flexion:  +  Taniyas maneuver:  -  Squat     +    LIGAMENT EXAMINATION:  ACL / Lachman:  normal (-1 to 2mm)    PCL-Post.  drawer: normal 0 to 2mm  MCL- Valgus:  normal 0 to 2mm  LCL- Varus:  normal 0 to 2mm  Pivot shift: normal (Equal)   Dial Test: difference c/w other side   At 30° flexion: normal (< 5°)    At 90° flexion: normal (< 5°)   Reverse Pivot Shift:   normal (Equal)     STRENGTH: (* = with pain) PAINFUL SIDE   Quadricep   5/5   Hamstrin/5    EXTREMITY NEURO-VASCULAR EXAMINATION:   Sensation:  Grossly intact to light touch all dermatomal regions.   Motor Function:  Fully intact motor function at hip, knee, foot and ankle    DTRs;  quadriceps and  achilles 2+.  No clonus and downgoing Babinski.    Vascular status:  DP and PT pulses 2+, brisk capillary refill, symmetric.     Other Findings:  + step down bilat  + bridge test     Xrays:  Xrays of the bilateral knees with flexion were ordered and reviewed by me today. No fracture, subluxation, or other significant bony or joint abnormality is identified. Bony alignment is normal. Joints and soft tissues are unremarkable.       ASSESSMENT:    1. left Knee pain  2. Left knee trauma  Bilateral hip abd/core weakness    PLAN:    1. Continue to rest and ice the knee and see if it improves with time. Possible PT in future pending shoulder MRI. Let me know if it does not improve or if mechanical symptoms begin to occur.   2. Ice compresses prn pain     All questions were answered, pt will contact us for questions or concerns in the interim.     I made the decision to obtain old records of the patient including previous notes and imaging. New imaging was ordered today of the extremity or extremities evaluated. I independently reviewed and interpreted the radiographs and/or MRIs today as well as prior imaging.     The total face-to-face encounter time with this patient was 45 minutes and greater than 50% of of the encounter  time was spent counseling the patient, discussion of imaging findings, diagnosis, possibilities, treatment options, and coordinating care. Significant time was also spent educating the patient, giving detail post-visit instructions, and discussing risks and benefits of treatment. Patient also had many specific questions regarding the options and long-term treatment effects that were answered during the visit today.

## 2025-04-21 ENCOUNTER — HOSPITAL ENCOUNTER (OUTPATIENT)
Dept: RADIOLOGY | Facility: OTHER | Age: 55
Discharge: HOME | End: 2025-04-21
Attending: OBSTETRICS & GYNECOLOGY
Payer: COMMERCIAL

## 2025-04-21 DIAGNOSIS — Z12.31 ENCOUNTER FOR SCREENING MAMMOGRAM FOR BREAST CANCER: ICD-10-CM

## 2025-04-21 PROCEDURE — 77063 BREAST TOMOSYNTHESIS BI: CPT | Mod: 26,,, | Performed by: RADIOLOGY

## 2025-04-21 PROCEDURE — 77063 BREAST TOMOSYNTHESIS BI: CPT | Mod: TC

## 2025-04-21 PROCEDURE — 77067 SCR MAMMO BI INCL CAD: CPT | Mod: 26,,, | Performed by: RADIOLOGY

## 2025-04-22 ENCOUNTER — TELEPHONE (OUTPATIENT)
Dept: OBSTETRICS AND GYNECOLOGY | Facility: CLINIC | Age: 55
End: 2025-04-22
Payer: COMMERCIAL

## 2025-04-22 NOTE — TELEPHONE ENCOUNTER
----- Message from [email or web address] sent at 4/22/2025  1:32 PM CDT -----  Name of Who is Calling: VITOR VALENTINO [[id number]]What is the request in detail: Pt returned call to the office.Please contact to further discuss and advise.Can the clinic reply by [facility]: YWhat Number to Call Back [facility]:  [phone]

## 2025-04-23 ENCOUNTER — RESULTS FOLLOW-UP (OUTPATIENT)
Dept: OBSTETRICS AND GYNECOLOGY | Facility: CLINIC | Age: 55
End: 2025-04-23

## 2025-04-23 ENCOUNTER — TELEPHONE (OUTPATIENT)
Dept: OBSTETRICS AND GYNECOLOGY | Facility: CLINIC | Age: 55
End: 2025-04-23
Payer: COMMERCIAL

## 2025-04-23 DIAGNOSIS — Z91.89 INCREASED RISK OF BREAST CANCER: Primary | ICD-10-CM

## 2025-04-23 DIAGNOSIS — Z80.3 FAMILY HISTORY OF BREAST CANCER IN MOTHER: ICD-10-CM

## 2025-04-23 NOTE — TELEPHONE ENCOUNTER
Called patient:    Discussed results of mammogram:    Impression:   No mammographic evidence of malignancy.  BI-RADS Category 1: Negative  Recommendation:  Routine screening mammogram in 1 year is recommended.     Your estimated lifetime risk of breast cancer (to age 85) based on Tyrer-Cuzick risk assessment model is 20.64%.  According to the American Cancer Society, patients with a lifetime breast cancer risk of 20% or higher might benefit from supplemental screening tests, such as screening breast MRI.    Family history of mother with breast cancer.    We reviewed the Tyrer-Cuzick risk scale, her risk of 20.64%, and option of additional counseling and testing at [facility].  She will like this consult - orders placed.

## 2025-05-20 ENCOUNTER — TELEPHONE (OUTPATIENT)
Dept: INTERNAL MEDICINE | Facility: CLINIC | Age: 55
End: 2025-05-20
Payer: COMMERCIAL

## 2025-05-20 NOTE — TELEPHONE ENCOUNTER
Tried calling pt,  gave the okay to take her on as a patient. Was calling to schedule appt. No answer lvm

## 2025-05-20 NOTE — TELEPHONE ENCOUNTER
----- Message from Sachi sent at 5/20/2025  4:25 PM CDT -----  Contact: [phone]  Type: Returning a callWho left a message?When did the practice call?today Does patient know what this is regarding:Est ne patient appt Would the patient rather a call back or a response via [facility]? Call Comments:

## 2025-06-03 ENCOUNTER — OFFICE VISIT (OUTPATIENT)
Dept: OBSTETRICS AND GYNECOLOGY | Facility: CLINIC | Age: 55
End: 2025-06-03
Attending: OBSTETRICS & GYNECOLOGY
Payer: COMMERCIAL

## 2025-06-03 VITALS
WEIGHT: 107.56 LBS | BODY MASS INDEX: 17.29 KG/M2 | HEIGHT: 66 IN | DIASTOLIC BLOOD PRESSURE: 56 MMHG | SYSTOLIC BLOOD PRESSURE: 100 MMHG

## 2025-06-03 DIAGNOSIS — Z12.4 PAP SMEAR FOR CERVICAL CANCER SCREENING: ICD-10-CM

## 2025-06-03 DIAGNOSIS — Z80.3 FAMILY HISTORY OF BREAST CANCER IN MOTHER: ICD-10-CM

## 2025-06-03 DIAGNOSIS — Z78.0 POSTMENOPAUSAL STATUS: ICD-10-CM

## 2025-06-03 DIAGNOSIS — Z01.419 WOMEN'S ANNUAL ROUTINE GYNECOLOGICAL EXAMINATION: Primary | ICD-10-CM

## 2025-06-03 PROCEDURE — 88175 CYTOPATH C/V AUTO FLUID REDO: CPT | Mod: TC | Performed by: OBSTETRICS & GYNECOLOGY

## 2025-06-03 PROCEDURE — 87624 HPV HI-RISK TYP POOLED RSLT: CPT | Performed by: OBSTETRICS & GYNECOLOGY

## 2025-06-03 PROCEDURE — 99999 PR PBB SHADOW E&M-EST. PATIENT-LVL III: CPT | Mod: PBBFAC,,, | Performed by: OBSTETRICS & GYNECOLOGY

## 2025-06-06 ENCOUNTER — PATIENT MESSAGE (OUTPATIENT)
Dept: OBSTETRICS AND GYNECOLOGY | Facility: CLINIC | Age: 55
End: 2025-06-06
Payer: COMMERCIAL

## 2025-06-06 LAB
INSULIN SERPL-ACNC: NORMAL U[IU]/ML
LAB AP BETHESDA CATEGORY: NORMAL
LAB AP CLINICAL FINDINGS: NORMAL
LAB AP CONTRACEPTIVES: NORMAL
LAB AP GYN ADDITIONAL FINDINGS: NORMAL
LAB AP LMP DATE: NORMAL
LAB AP OCHS PAP SPECIMEN ADEQUACY: NORMAL
LAB AP OHS PAP INTERPRETATION: NORMAL
LAB AP PAP DISCLAIMER COMMENTS: NORMAL
LAB AP PAP ESTROGEN REPLACEMENT THERAPY: NORMAL
LAB AP PAP PMP: NORMAL
LAB AP PAP PREVIOUS BX: NORMAL
LAB AP PAP PRIOR TREATMENT: NORMAL
LAB AP PERFORMING LOCATION(S): NORMAL

## 2025-06-10 ENCOUNTER — PATIENT MESSAGE (OUTPATIENT)
Dept: OBSTETRICS AND GYNECOLOGY | Facility: CLINIC | Age: 55
End: 2025-06-10
Payer: COMMERCIAL

## 2025-06-20 ENCOUNTER — OFFICE VISIT (OUTPATIENT)
Dept: INTERNAL MEDICINE | Facility: CLINIC | Age: 55
End: 2025-06-20
Payer: COMMERCIAL

## 2025-06-20 ENCOUNTER — LAB VISIT (OUTPATIENT)
Dept: LAB | Facility: HOSPITAL | Age: 55
End: 2025-06-20
Attending: FAMILY MEDICINE
Payer: COMMERCIAL

## 2025-06-20 VITALS
SYSTOLIC BLOOD PRESSURE: 102 MMHG | HEIGHT: 66 IN | WEIGHT: 108 LBS | BODY MASS INDEX: 17.36 KG/M2 | HEART RATE: 53 BPM | DIASTOLIC BLOOD PRESSURE: 60 MMHG | OXYGEN SATURATION: 98 %

## 2025-06-20 DIAGNOSIS — G89.29 CHRONIC RIGHT SHOULDER PAIN: ICD-10-CM

## 2025-06-20 DIAGNOSIS — Z76.89 ENCOUNTER TO ESTABLISH CARE WITH NEW DOCTOR: ICD-10-CM

## 2025-06-20 DIAGNOSIS — Z00.00 ANNUAL PHYSICAL EXAM: ICD-10-CM

## 2025-06-20 DIAGNOSIS — M25.511 CHRONIC RIGHT SHOULDER PAIN: ICD-10-CM

## 2025-06-20 DIAGNOSIS — G89.29 CHRONIC NECK PAIN: ICD-10-CM

## 2025-06-20 DIAGNOSIS — Z00.00 ANNUAL PHYSICAL EXAM: Primary | ICD-10-CM

## 2025-06-20 DIAGNOSIS — M54.2 CHRONIC NECK PAIN: ICD-10-CM

## 2025-06-20 PROBLEM — R29.3 POSTURE IMBALANCE: Status: RESOLVED | Noted: 2021-12-21 | Resolved: 2025-06-20

## 2025-06-20 LAB
ALBUMIN SERPL BCP-MCNC: 4.4 G/DL (ref 3.5–5.2)
ALP SERPL-CCNC: 87 UNIT/L (ref 40–150)
ALT SERPL W/O P-5'-P-CCNC: 39 UNIT/L (ref 10–44)
ANION GAP (OHS): 9 MMOL/L (ref 8–16)
AST SERPL-CCNC: 34 UNIT/L (ref 11–45)
BILIRUB SERPL-MCNC: 0.4 MG/DL (ref 0.1–1)
BUN SERPL-MCNC: 18 MG/DL (ref 6–20)
CALCIUM SERPL-MCNC: 9.4 MG/DL (ref 8.7–10.5)
CHLORIDE SERPL-SCNC: 103 MMOL/L (ref 95–110)
CHOLEST SERPL-MCNC: 215 MG/DL (ref 120–199)
CHOLEST/HDLC SERPL: 2.6 {RATIO} (ref 2–5)
CO2 SERPL-SCNC: 28 MMOL/L (ref 23–29)
CREAT SERPL-MCNC: 0.7 MG/DL (ref 0.5–1.4)
EAG (OHS): 117 MG/DL (ref 68–131)
ERYTHROCYTE [DISTWIDTH] IN BLOOD BY AUTOMATED COUNT: 12.8 % (ref 11.5–14.5)
GFR SERPLBLD CREATININE-BSD FMLA CKD-EPI: >60 ML/MIN/1.73/M2
GLUCOSE SERPL-MCNC: 80 MG/DL (ref 70–110)
HBA1C MFR BLD: 5.7 % (ref 4–5.6)
HCT VFR BLD AUTO: 40.7 % (ref 37–48.5)
HCV AB SERPL QL IA: NORMAL
HDLC SERPL-MCNC: 83 MG/DL (ref 40–75)
HDLC SERPL: 38.6 % (ref 20–50)
HGB BLD-MCNC: 12.9 GM/DL (ref 12–16)
LDLC SERPL CALC-MCNC: 111.4 MG/DL (ref 63–159)
MCH RBC QN AUTO: 30.3 PG (ref 27–31)
MCHC RBC AUTO-ENTMCNC: 31.7 G/DL (ref 32–36)
MCV RBC AUTO: 96 FL (ref 82–98)
NONHDLC SERPL-MCNC: 132 MG/DL
PLATELET # BLD AUTO: 192 K/UL (ref 150–450)
PMV BLD AUTO: 11 FL (ref 9.2–12.9)
POTASSIUM SERPL-SCNC: 4.3 MMOL/L (ref 3.5–5.1)
PROT SERPL-MCNC: 7 GM/DL (ref 6–8.4)
RBC # BLD AUTO: 4.26 M/UL (ref 4–5.4)
SODIUM SERPL-SCNC: 140 MMOL/L (ref 136–145)
TRIGL SERPL-MCNC: 103 MG/DL (ref 30–150)
TSH SERPL-ACNC: 1.01 UIU/ML (ref 0.4–4)
WBC # BLD AUTO: 3.87 K/UL (ref 3.9–12.7)

## 2025-06-20 PROCEDURE — 83036 HEMOGLOBIN GLYCOSYLATED A1C: CPT

## 2025-06-20 PROCEDURE — 84478 ASSAY OF TRIGLYCERIDES: CPT

## 2025-06-20 PROCEDURE — 99999 PR PBB SHADOW E&M-EST. PATIENT-LVL IV: CPT | Mod: PBBFAC,,, | Performed by: FAMILY MEDICINE

## 2025-06-20 PROCEDURE — 82247 BILIRUBIN TOTAL: CPT

## 2025-06-20 PROCEDURE — 86803 HEPATITIS C AB TEST: CPT

## 2025-06-20 PROCEDURE — 84443 ASSAY THYROID STIM HORMONE: CPT

## 2025-06-20 PROCEDURE — 85027 COMPLETE CBC AUTOMATED: CPT

## 2025-06-20 PROCEDURE — 36415 COLL VENOUS BLD VENIPUNCTURE: CPT

## 2025-06-20 RX ORDER — ZINC GLUCONATE 50 MG
50 TABLET ORAL DAILY
COMMUNITY

## 2025-06-20 RX ORDER — MULTIVITAMIN
1 TABLET ORAL DAILY
COMMUNITY

## 2025-06-20 NOTE — PROGRESS NOTES
"Subjective:       Patient ID: Shantell Aguilar is a 54 y.o. female.    Chief Complaint: Establish Care    HPI    Shantell Aguilar is a 54 y.o. female for checkup    Chronic neck and rt shoulder pain    #Obgyn:  - est w/ Dr. Cuevas,  6/2025    #BMI 17    #Lifestyle:  - active w/ dance    #HM:  - colon cancer screening ~age 50, normal per pt report, unknown provider, pt will try and get us more info when possible    Review of Systems   Constitutional:  Negative for appetite change, fatigue and fever.   HENT:  Negative for congestion.    Respiratory:  Negative for cough and shortness of breath.    Cardiovascular:  Negative for chest pain and palpitations.   Gastrointestinal:  Negative for abdominal pain, constipation, diarrhea, nausea and vomiting.   Genitourinary:  Negative for dysuria.   Musculoskeletal:  Positive for arthralgias and neck pain. Negative for back pain.   Skin:  Negative for rash.   Neurological:  Negative for weakness, numbness and headaches.         Past Medical History:   Diagnosis Date    Abnormal Pap smear of cervix 2000    dysplasia - Conization?    Foot fracture, left     "stress fracture"    Frequent UTI     Neck pain 12/21/2021    Posture imbalance 12/21/2021        Prior to Admission medications    Not on File        Past medical history, surgical history, and family medical history reviewed and updated as appropriate.    Medications and allergies reviewed.     Objective:          Vitals:    06/20/25 1018   BP: 102/60   BP Location: Left arm   Patient Position: Sitting   Pulse: (!) 53   SpO2: 98%   Weight: 49 kg (108 lb 0.4 oz)   Height: 5' 6" (1.676 m)     Body mass index is 17.44 kg/m².  Physical Exam  Vitals and nursing note reviewed.   Constitutional:       General: She is not in acute distress.     Appearance: Normal appearance.   Eyes:      Extraocular Movements: Extraocular movements intact.   Cardiovascular:      Rate and Rhythm: Normal rate and regular rhythm.      Pulses: " Normal pulses.      Heart sounds: Normal heart sounds. No murmur heard.  Pulmonary:      Effort: Pulmonary effort is normal. No respiratory distress.      Breath sounds: Normal breath sounds. No wheezing.   Neurological:      Mental Status: She is alert and oriented to person, place, and time.   Psychiatric:         Mood and Affect: Mood normal.         Behavior: Behavior normal.         Lab Results   Component Value Date    WBC 8.08 08/23/2022    HGB 13.5 08/23/2022    HCT 41.0 08/23/2022     (L) 08/23/2022    ALT 28 08/23/2022    AST 26 08/23/2022     08/23/2022    K 4.2 08/23/2022     08/23/2022    CREATININE 0.7 08/23/2022    BUN 14 08/23/2022    CO2 25 08/23/2022       Assessment:       1. Annual physical exam    2. Encounter to establish care with new doctor    3. Chronic neck pain    4. Chronic right shoulder pain          Plan:   1. Annual physical exam  -     Comprehensive Metabolic Panel; Future; Expected date: 06/20/2025  -     CBC Without Differential; Future; Expected date: 06/20/2025  -     Lipid Panel; Future; Expected date: 06/20/2025  -     Hemoglobin A1C; Future; Expected date: 06/20/2025  -     TSH; Future; Expected date: 06/20/2025  -     Hepatitis C Antibody; Future; Expected date: 06/20/2025    2. Encounter to establish care with new doctor    3. Chronic neck pain  -     Ambulatory referral/consult to Spine Care; Future; Expected date: 06/20/2025    4. Chronic right shoulder pain  -     Ambulatory referral/consult to Orthopedics; Future; Expected date: 06/20/2025        Health maintenance reviewed with patient.     No follow-ups on file.    As this patient's primary care physician, I am actively managing and/or treating his/her chronic medical conditions now and in the future. This includes, but is not limited to, medication management, coordination of care, documentation review from his/her specialists, and labs/imaging review that I have performed to prepare for this visit  as well as will do so in the future as part of my care continuity for this patient.    Sharath Galvan MD  Family Medicine / Primary Care  [facility]  6/20/2025

## 2025-06-23 ENCOUNTER — RESULTS FOLLOW-UP (OUTPATIENT)
Dept: INTERNAL MEDICINE | Facility: CLINIC | Age: 55
End: 2025-06-23

## 2025-07-21 ENCOUNTER — CLINICAL SUPPORT (OUTPATIENT)
Dept: REHABILITATION | Facility: HOSPITAL | Age: 55
End: 2025-07-21
Attending: FAMILY MEDICINE
Payer: COMMERCIAL

## 2025-07-21 ENCOUNTER — HOSPITAL ENCOUNTER (OUTPATIENT)
Dept: RADIOLOGY | Facility: HOSPITAL | Age: 55
Discharge: HOME | End: 2025-07-21
Attending: PHYSICIAN ASSISTANT
Payer: COMMERCIAL

## 2025-07-21 ENCOUNTER — OFFICE VISIT (OUTPATIENT)
Dept: ORTHOPEDICS | Facility: CLINIC | Age: 55
End: 2025-07-21
Payer: COMMERCIAL

## 2025-07-21 DIAGNOSIS — M25.511 CHRONIC RIGHT SHOULDER PAIN: ICD-10-CM

## 2025-07-21 DIAGNOSIS — G89.29 CHRONIC NECK PAIN: ICD-10-CM

## 2025-07-21 DIAGNOSIS — M79.10 MUSCLE TENSION PAIN: Primary | ICD-10-CM

## 2025-07-21 DIAGNOSIS — G89.29 CHRONIC RIGHT SHOULDER PAIN: ICD-10-CM

## 2025-07-21 DIAGNOSIS — M54.2 NECK PAIN: Primary | ICD-10-CM

## 2025-07-21 DIAGNOSIS — R29.898 WEAKNESS OF RIGHT SHOULDER: ICD-10-CM

## 2025-07-21 DIAGNOSIS — M54.2 CHRONIC NECK PAIN: ICD-10-CM

## 2025-07-21 PROCEDURE — 99203 OFFICE O/P NEW LOW 30 MIN: CPT | Mod: S$GLB,,, | Performed by: PHYSICIAN ASSISTANT

## 2025-07-21 PROCEDURE — 73030 X-RAY EXAM OF SHOULDER: CPT | Mod: 26,RT,, | Performed by: RADIOLOGY

## 2025-07-21 PROCEDURE — 99999 PR PBB SHADOW E&M-EST. PATIENT-LVL III: CPT | Mod: PBBFAC,,, | Performed by: PHYSICIAN ASSISTANT

## 2025-07-21 PROCEDURE — 97112 NEUROMUSCULAR REEDUCATION: CPT

## 2025-07-21 PROCEDURE — 97161 PT EVAL LOW COMPLEX 20 MIN: CPT

## 2025-07-21 PROCEDURE — 3044F HG A1C LEVEL LT 7.0%: CPT | Mod: CPTII,S$GLB,, | Performed by: PHYSICIAN ASSISTANT

## 2025-07-21 PROCEDURE — 1159F MED LIST DOCD IN RCRD: CPT | Mod: CPTII,S$GLB,, | Performed by: PHYSICIAN ASSISTANT

## 2025-07-21 PROCEDURE — 97110 THERAPEUTIC EXERCISES: CPT

## 2025-07-21 PROCEDURE — 73030 X-RAY EXAM OF SHOULDER: CPT | Mod: TC,RT

## 2025-07-21 NOTE — PROGRESS NOTES
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    HISTORY OF PRESENT ILLNESS:  She experiences persistent pain and tension localized between neck and shoulder area, describing a constant pulling sensation. Pain intensity is currently low, which she attributes to reduced dance activity in recent weeks. Symptoms worsen after intense dancing, particularly swing and disco styles involving extensive arm and neck movements, with increased discomfort the day following vigorous dance sessions. Sleep is significantly disrupted with frequent nighttime awakenings, requiring stretching and noting neck cracking. Pillow elevation appears to exacerbate discomfort so she does not sleep with a pillow. She denies any paresthesias.    PAST INJURIES:  She reports two significant injuries: a tub fall approximately two years ago where she remained in a compromised position for 15 minutes resulting in neck pain, and a dog-related incident one year ago affecting her shoulder.     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 She is currently preparing a dance group for a trip to [place] at the end of the year, which will increase her teaching and dancing frequency.    CURRENT TREATMENT:  She manages symptoms through simple stretching exercises at home. She previously practiced yoga but discontinued due to pain. Past dry needling treatments provided only temporary relief. She is scheduled for her first PT evaluation today at [facility].    MEDICATIONS:  She denies regular medication use, reporting she rarely takes aspirin.      ROS:  General: -fever, -chills, -fatigue, -weight gain, -weight loss, +sleep disturbances, +difficulty staying asleep  Musculoskeletal: -joint pain, -muscle pain, +muscle  "tension, +neck pain, +neck tension, +limited movement  Skin: -rash, -lesion  Neurological: -headache, -dizziness, -numbness, -tingling        Medications: I have reviewed medication list in the chart at the time of this encounter.     Review of patient's allergies indicates:   Allergen Reactions    Demerol [meperidine] Other (See Comments)     "I had it before a sedation and I didn't wake up when I was supposed to.  I was overly sedated."    Penicillins       Past Medical History:   Diagnosis Date    Abnormal Pap smear of cervix 2000    dysplasia - [place]?    Foot fracture, left     "stress fracture"    Frequent UTI     Neck pain 12/21/2021    Posture imbalance 12/21/2021     Past Surgical History:   Procedure Laterality Date    BREAST BIOPSY      CERVICAL BIOPSY  W/ LOOP ELECTRODE EXCISION         Objective:     Physical Exam  Cardiovascular:      Pulses:           Radial pulses are 2+ on the right side.   Musculoskeletal:      Right shoulder: Tenderness present. No swelling, deformity, effusion or bony tenderness. Normal range of motion. Normal strength.      Left shoulder: No swelling, deformity, effusion, tenderness or bony tenderness. Normal range of motion. Normal strength.      Right elbow: No swelling. Normal range of motion. No tenderness.      Left elbow: No swelling. Normal range of motion. No tenderness.      Cervical back: Spasms and tenderness present. No swelling, edema, erythema, signs of trauma, rigidity, torticollis or bony tenderness. Pain with movement (worse with lateral bending to affected side and rotation to opposite side) present. Normal range of motion.      Thoracic back: No bony tenderness.        Back:           Right Shoulder Exam     Range of Motion   The patient has normal right shoulder ROM.    Muscle Strength   Abduction: 5/5   Internal rotation: 5/5   External rotation: 5/5   Supraspinatus: 5/5   Subscapularis: 5/5     Other   Erythema: absent  Scars: absent      Left Shoulder " Exam     Tenderness   The patient is experiencing no tenderness.     Range of Motion   The patient has normal left shoulder ROM.    Muscle Strength   Abduction: 5/5   Internal rotation: 5/5   External rotation: 5/5   Supraspinatus: 5/5   Subscapularis: 5/5     Other   Erythema: absent  Scars: absent            Right shoulder:  Negative Painful arc test from °.   Negative Neer's impingement sign.   Negative Esvin's test.   Negative Hawkin's test.   Negative Drop arm test.  Negative Yamila's supraspinatus test (empty can) test.   Negative Hornblower's (TM weakness) sign.  Negative Lift off test.   Negative internal rotation lag sign.   Negative Abduction/external rotation test.  Negative external rotation lag sign.   Negative Speed's test.   Negative O'aaron (active compression) test.       Imaging:  I have independently interpreted and reviewed R shoulder XR obtained today and past CT C spine with patient.    CT Cervical Spine Without Contrast  Order: [id number]   Status: Final result       Next appt: Today at 11:00 AM in Outpatient Rehab (Joseph Romano, PT, DPT)    Test Result Released: Yes (not seen)    0 Result Notes  Details    Reading Physician Reading Date Result Priority   Ravi Velazco MD  [phone]  2/20/2024 STAT     Narrative & Impression  EXAMINATION:  CT CERVICAL SPINE WITHOUT CONTRAST     CLINICAL HISTORY:  Polytrauma, blunt;     TECHNIQUE:  Low dose axial images, sagittal and coronal reformations were performed though the cervical spine.  Contrast was not administered.     COMPARISON:  None     FINDINGS:  No evidence of acute cervical spine fracture or dislocation.  Odontoid process is intact.  Craniocervical junction is unremarkable.  There is straightening of the normal cervical lordosis.  Minor degenerative changes are seen with facet arthropathy on the left at the C4-5 level.     Surrounding soft tissues show no significant abnormalities.  Airway is patent.  Partially visualized lung  apices are clear.     Impression:     No evidence of acute cervical spine fracture or dislocation.        Electronically signed by:Ravi Velazco MD  Date:                                            02/20/2024  Time:                                           21:49        Exam Ended: 02/20/24 21:41 CST Last Resulted: 02/20/24 21:49 CST         Assessment:       1. Muscle tension pain    2. Chronic right shoulder pain       Plan:       Orders Placed This Encounter    X-Ray Shoulder Trauma 3 view Right    Ambulatory referral/consult to Pain Clinic        Assessment & Plan    IMPRESSION:  - Assessed right neck and shoulder pain, ruling out rotator cuff tear, shoulder labrum problem, cervical fracture, thoracic outlet syndrome, PAD/ishcemic limb, and radiculopathy nerve based on exam and imaging.  - Identified muscle tension in paraspinal cervical, trapezius, supraspinatus muscles as likely cause of pain.  - Reviewed cervical CT C spine, confirming absence of fractures or dislocations. No DJD.  - Considered trigger point injection as potential treatment option in the future with Pain Medicine before her trip in Dec '25.  - Recommend conservative management with NSAIDs, muscle relaxants, ice/heat, and physical therapy.    PATIENT EDUCATION:  - Explained anatomy of shoulder and neck muscles, including trapezius, supraspinatus, and infraspinatus.  - Discussed mechanism of inflammatory pain and rationale for using anti-inflammatory medications.  - Provided information on proper ergonomics, including optimal posture for computer use.  - Explained concept of dry needling and its potential benefits for muscle tension.    ACTION ITEMS/LIFESTYLE:  - Patient to apply ice to affected area after dance sessions with gel ice packs for convenience, potentially during car ride home.  - Patient to experiment with pillow thickness to find optimal neck support during sleep.  - Patient should be mindful of activities that may exacerbate  symptoms and try to limit them.    MEDICATIONS:  - Started ibuprofen 400-800 mg before strenuous dance sessions to reduce post-activity pain.    REFERRALS:  - Referred to pain management specialist for potential trigger point injection.    FOLLOW UP:  - Contact the office if symptoms worsen or new joint issues arise.  - Follow up as needed.         Future Appointments   Date Time Provider Department Center   7/21/2025 11:00 AM Joseph Romano, PT, DPT [facility]       Staci Howell PA-C  Orthopedic Surgery  [facility]

## 2025-07-21 NOTE — PROGRESS NOTES
Outpatient Rehab    Physical Therapy Evaluation    Patient Name: Shantell Aguilar  MRN: [id number]  YOB: 1970  Encounter Date: 7/21/2025    Therapy Diagnosis:   Encounter Diagnoses   Name Primary?    Chronic neck pain     Neck pain Yes    Weakness of right shoulder      Physician: Sharath Galvan MD    Physician Orders: Eval and Treat  Medical Diagnosis: Chronic neck pain  Surgical Diagnosis: Not applicable for this Episode   Surgical Date: Not applicable for this Episode  Days Since Last Surgery: Not applicable for this Episode    Visit # / Visits Authorized:  1 / 1  Insurance Authorization Period: 6/20/2025 to 6/20/2026  Date of Evaluation: 7/21/2025  Plan of Care Certification: 7/21/2025 to 9/21/2025     Time In: 1100   Time Out: 1155  Total Time (in minutes): 55   Total Billable Time (in minutes): 55    Intake Outcome Measure for FOTO Survey    Therapist reviewed FOTO scores for Shantell Aguilar on 7/21/2025.   FOTO report - see Media section or FOTO account episode details.     Intake Score (%): 72    Precautions:       Subjective   History of Present Illness  Shantell is a 54 y.o. female who reports to physical therapy with a chief concern of R sided neck pain.     The patient reports a medical diagnosis of Chronic neck pain.    Diagnostic tests related to this condition: X-ray.   X-Ray Details: TECHNIQUE:  Three or four views of the right shoulder were performed.     COMPARISON:  None     FINDINGS:  Mild DJD.  No fracture or dislocation.  No bone destruction identified.  No significant soft tissue calcifications     Impression:     See above    History of Present Condition/Illness: Patient reports a year ago a dog pulled her while holding the leash. Patient reports at that time she had trouble moving the arm overhead. Patient reports her range of motion of the R shoulder is primarily back, but the strain of the muscles always feels like it is there. Patient reports waking up in the  "morning, dancing, and stress can bother her symptoms. Patient reports she is a  as well. Patient reports sitting for too long will cause problems, so she tries to stand mostly.     Activities of Daily Living  Social history was obtained from Patient.       General Current Level of Function Comments: Cutting back on dancing moves (no dipping, no movements with a lot of shoulder rom overhead)       Previously independent with activities of daily living? Yes     Currently independent with activities of daily living? Yes          Previously independent with instrumental activities of daily living? Yes     Currently independent with instrumental activities of daily living? Yes              Pain     Patient reports a current pain level of 1/10. Pain at best is reported as 1/10. Pain at worst is reported as 6/10.   Location: R upper trap and Lat  Clinical Progression (since onset): Stable  Pain Qualities: Tightness, Pulling, Other (Comment)  Other Pain Qualities: soreness  Pain-Relieving Factors: Rest, Medications - over-the-counter  Pain-Aggravating Factors: Other (Comment)  Other Pain-Aggravating Factors: Morning time, after dancing, stress         Employment  Patient reports: Does the patient's condition impact their ability to work?  Employment Status: Employed full-time   Dance instructor and        Past Medical History/Physical Systems Review:   Shantell Aguilar  has a past medical history of Abnormal Pap smear of cervix, Foot fracture, left, Frequent UTI, Neck pain, and Posture imbalance.    Shantell Aguilar  has a past surgical history that includes Cervical biopsy w/ loop electrode excision and Breast biopsy.    Shantell has a current medication list which includes the following prescription(s): multivitamin and zinc gluconate.    Review of patient's allergies indicates:   Allergen Reactions    Demerol [meperidine] Other (See Comments)     "I had it before a sedation " "and I didn't wake up when I was supposed to.  I was overly sedated."    Penicillins         Objective   Posture  Patient presents with a Forward head position.     Shoulders are Rounded.             Spinal Muscle Palpation     TTP of R levator Scapulae                 Subcranial Range of Motion   Active Restricted? Passive Restricted? Pain   Flexion         Protraction         Retraction           Cervical Range of Motion   Active (deg) Passive (deg) Pain   Flexion 50       Extension 55   Yes   Right Lateral Flexion 40       Right Rotation  (WNL)       Left Lateral Flexion 25   Yes (Pulling on R upper trap)   Left Rotation  (WNL)              Shoulder Range of Motion  Right Shoulder   Active (deg) Passive (deg) Pain   Flexion 180 180     Extension         Scaption         ABduction 180 180     ADduction         Horizontal ABduction         Horizontal ADduction         External Rotation (Shoulder ABducted 0 degrees)         External Rotation (Shoulder ABducted 45 degrees)         External Rotation (Shoulder ABducted 90 degrees) 90 90     Internal Rotation (Shoulder ABducted 0 degrees)         Internal Rotation (Shoulder ABducted 45 degrees)         Internal Rotation (Shoulder ABducted 90 degrees) 60 70 Yes     Left Shoulder   Active (deg) Passive (deg) Pain   Flexion 180 180     Extension         Scaption         ABduction 180 180     ADduction         Horizontal ABduction         Horizontal ADduction         External Rotation (Shoulder ABducted 0 degrees)         External Rotation (Shoulder ABducted 45 degrees)         External Rotation (Shoulder ABducted 90 degrees) 90 90     Internal Rotation (Shoulder ABducted 0 degrees)         Internal Rotation (Shoulder ABducted 45 degrees)         Internal Rotation (Shoulder ABducted 90 degrees) 80 85                   Shoulder Strength - Planes of Motion   Right Strength Right Pain Left Strength Left  Pain   Flexion 4+   4+     Extension           ABduction 4+   4 Yes "   ADduction           Horizontal ABduction           Horizontal ADduction           Internal Rotation 0° 4+   4 Yes   Internal Rotation 90°           External Rotation 0° 4+   4+     External Rotation 90°               Shoulder Strength - Rotator Cuff Muscles   Right Strength Right Pain Left Strength Left  Pain   Supraspinatus           Infraspinatus           Teres Minor           Subscapularis     4 Yes                  Treatment:       THERAPEUTIC EXERCISES to develop strength, endurance, ROM, and flexibility for 10 minutes including    Education on HEP and Diagnosis       Seated Upper Trapezius Stretch  4 sets - 30s hold  Seated Levator Scapulae Stretch 4 sets - 30s hold      MANUAL THERAPY TECHNIQUES were applied to Neck for 0 minutes including:        NEUROMUSCULAR RE-EDUCATION ACTIVITIES to improve Coordination, Sense, Proprioception, Posture, and Motor Control for 10 minutes.  The following were included:    Shoulder External Rotation and Scapular Retraction RTB 2-3 sets - 10 reps  Standing Shoulder Horizontal Abduction RTB 2-3 sets - 10 reps  Seated Scapular Retraction  20 reps - 3 seconds hold    THERAPEUTIC ACTIVITIES to improve dynamic and functional performance for 0 minutes including        Assessment & Plan   Assessment  Shantell presents with a condition of Low complexity.   Presentation of Symptoms: Stable       Functional Limitations: Activity tolerance, Carrying objects, Completing work/school activities, Pain when reaching, Pain with ADLs/IADLs, Sitting tolerance, Reaching  Impairments: Activity intolerance, Abnormal or restricted range of motion, Impaired physical strength, Lack of appropriate home exercise program, Pain with functional activity    Patient Goal for Therapy (PT): Improve R sided neck pain to return to pain free dancing  Prognosis: Good  Assessment Details: Patient demonstrates deficits with range of motion, strength, and function that limit ability to participate in pain free  dancing work, and recreational activities. Patient presents with localized tenderness to levator scapulae, tightness of the neck, weakness of the R UE. Patient was progressed today to address cervical mobility limitations, posture, and scapular positioning. Patient had no adverse effects from today. They would benefit from skilled PT services to normalize kinetic chain mobility, strength, and function to safely return to their prior level of activity.    Plan  From a physical therapy perspective, the patient would benefit from: Skilled Rehab Services    Planned therapy interventions include: Therapeutic exercise, Therapeutic activities, Neuromuscular re-education, Manual therapy, ADLs/IADLs, and Other (Comment). FDN as needed  Planned modalities to include: Cryotherapy (cold pack), Diathermy, Electrical stimulation - attended, and Electrical stimulation - passive/unattended.        Visit Frequency: 1 times Per Week for 6 Weeks.       This plan was discussed with Patient.   Discussion participants: Agreed Upon Plan of Care             The patient's spiritual, cultural, and educational needs were considered, and the patient is agreeable to the plan of care and goals.     Education  Education was done with Patient. The patient's learning style includes Demonstration, Listening, and Pictures/video. The patient Demonstrates understanding and Verbalizes understanding.         Educated patient on patient diagnosis and prognosis. Educated patient on home program and expectations with physical therapy.       Goals:   Active       Functional outcome       Patient will show a significant change in FOTO patient-reported outcome tool to demonstrate subjective improvement       Start:  07/21/25    Expected End:  09/21/25            Patient stated goal: Improve R sided neck pain to return to pain free dancing        Start:  07/21/25    Expected End:  09/21/25            Patient will demonstrate independence in home program for  support of progression       Start:  07/21/25    Expected End:  09/21/25               Pain       Patient will report pain of 0/10 demonstrating a reduction of overall pain       Start:  07/21/25    Expected End:  09/21/25               Range of Motion       Patient will achieve Left cervical side bending ROM >30 degrees       Start:  07/21/25    Expected End:  09/21/25            Patient will achieve cervical flexion ROM >50 degrees       Start:  07/21/25    Expected End:  09/21/25            Patient will achieve cervical extension ROM > 50 degrees       Start:  07/21/25    Expected End:  09/21/25               Strength       Patient will achieve right shoulder strength of 4+/5       Start:  07/21/25    Expected End:  09/21/25                Joseph Romano PT, DPT

## 2025-07-21 NOTE — PATIENT INSTRUCTIONS
Access Code: [email or web address]  URL: [email or web address]  Date: 07/21/2025  Prepared by: Joseph Romano    Exercises  - Seated Scapular Retraction  - 2 x daily - 7 x weekly - 20 reps - 3 seconds hold  - Seated Upper Trapezius Stretch  - 2 x daily - 7 x weekly - 4 sets - 30s hold  - Seated Levator Scapulae Stretch  - 2 x daily - 7 x weekly - 4 sets - 30s hold  - Shoulder External Rotation and Scapular Retraction with Resistance  - 2 x daily - 7 x weekly - 2-3 sets - 10 reps  - Standing Shoulder Horizontal Abduction with Resistance  - 2 x daily - 7 x weekly - 2-3 sets - 10 reps

## 2025-07-27 ENCOUNTER — PATIENT MESSAGE (OUTPATIENT)
Dept: ADMINISTRATIVE | Facility: HOSPITAL | Age: 55
End: 2025-07-27
Payer: COMMERCIAL

## 2025-07-28 DIAGNOSIS — Z12.11 SCREENING FOR COLON CANCER: ICD-10-CM

## 2025-07-31 ENCOUNTER — CLINICAL SUPPORT (OUTPATIENT)
Dept: REHABILITATION | Facility: HOSPITAL | Age: 55
End: 2025-07-31
Attending: FAMILY MEDICINE
Payer: COMMERCIAL

## 2025-07-31 DIAGNOSIS — M54.2 NECK PAIN: Primary | ICD-10-CM

## 2025-07-31 DIAGNOSIS — R29.898 WEAKNESS OF RIGHT SHOULDER: ICD-10-CM

## 2025-07-31 PROCEDURE — 97112 NEUROMUSCULAR REEDUCATION: CPT

## 2025-07-31 PROCEDURE — 97110 THERAPEUTIC EXERCISES: CPT

## 2025-07-31 NOTE — PROGRESS NOTES
Outpatient Rehab    Physical Therapy Visit    Patient Name: Shantell Aguilar  MRN: [id number]  YOB: 1970  Encounter Date: 7/31/2025    Therapy Diagnosis:   Encounter Diagnoses   Name Primary?    Neck pain Yes    Weakness of right shoulder      Physician: Sharath Galvan MD    Physician Orders: Eval and Treat  Medical Diagnosis: Chronic neck pain  Surgical Diagnosis: Not applicable for this Episode   Surgical Date: Not applicable for this Episode  Days Since Last Surgery: Not applicable for this Episode    Visit # / Visits Authorized:  1 / 20  Insurance Authorization Period: 1/1/2025 to 12/31/2025  Date of Evaluation: 7/21/2025  Plan of Care Certification: 7/21/2025 to 9/21/2025      PT/PTA: PT   Number of PTA visits since last PT visit:0  Time In: 0700   Time Out: 0755  Total Time (in minutes): 55   Total Billable Time (in minutes): 55    FOTO:  Intake Score (%): 72  Survey Score 2 (%): Not applicable for this Episode  Survey Score 3 (%): Not applicable for this Episode    Precautions:         Subjective   Patient reports her discomfort in the neck is less compared to last session. Patient reports the intensity is the same, but she continues to have the discomfort consistently. Patient reports doing well with home exercises..  Pain reported as 1/10.      Objective            Treatment:       THERAPEUTIC EXERCISES to develop strength, endurance, ROM, and flexibility for 30 minutes including    Education on HEP and Diagnosis     UBE 3/3  Seated Upper Trapezius Stretch  4 sets - 30s hold  Seated Levator Scapulae Stretch 4 sets - 30s hold  Supine thoracic extension 2x10 full foam roller   Open books x15 ea way   Cupping with active left side bending stretch for R up trap      MANUAL THERAPY TECHNIQUES were applied to Neck for 0 minutes including:        NEUROMUSCULAR RE-EDUCATION ACTIVITIES to improve Coordination, Sense, Proprioception, Posture, and Motor Control for 25 minutes.  The following were  included:    Shoulder External Rotation and Scapular Retraction RTB 2-3 sets - 10 reps  Standing Shoulder Horizontal Abduction RTB 2-3 sets - 10 reps  Rows cables 20lbs 3x10   High Rows BTB 3x10   Supine cervical retraction 2x10 3s     THERAPEUTIC ACTIVITIES to improve dynamic and functional performance for 0 minutes including    Assessment & Plan   Assessment: Patient presents to PT today with improvement in symptoms today in R upper trap / levator. Patient was progressed today with posture / scapular control, strengthening, and cervical range of motion. Patient did well with progressions today without adverse effects.   Evaluation/Treatment Tolerance: Patient tolerated treatment well    The patient will continue to benefit from skilled outpatient physical therapy in order to address the deficits listed in the problem list on the initial evaluation, provide patient and family education, and maximize the patients level of independence in the home and community environments.     The patient's spiritual, cultural, and educational needs were considered, and the patient is agreeable to the plan of care and goals.           Plan: Cont plan of care    Goals:   Active       Functional outcome       Patient will show a significant change in FOTO patient-reported outcome tool to demonstrate subjective improvement (Ongoing)       Start:  07/21/25    Expected End:  09/21/25            Patient stated goal: Improve R sided neck pain to return to pain free dancing  (Ongoing)       Start:  07/21/25    Expected End:  09/21/25            Patient will demonstrate independence in home program for support of progression (Progressing)       Start:  07/21/25    Expected End:  09/21/25               Pain       Patient will report pain of 0/10 demonstrating a reduction of overall pain (Progressing)       Start:  07/21/25    Expected End:  09/21/25               Range of Motion       Patient will achieve Left cervical side bending ROM >30  degrees (Ongoing)       Start:  07/21/25    Expected End:  09/21/25            Patient will achieve cervical flexion ROM >50 degrees (Ongoing)       Start:  07/21/25    Expected End:  09/21/25            Patient will achieve cervical extension ROM > 50 degrees (Ongoing)       Start:  07/21/25    Expected End:  09/21/25               Strength       Patient will achieve right shoulder strength of 4+/5 (Ongoing)       Start:  07/21/25    Expected End:  09/21/25                Joseph Romano PT, MELVINT

## 2025-08-04 PROBLEM — G89.29 CHRONIC RIGHT SHOULDER PAIN: Status: RESOLVED | Noted: 2025-06-20 | Resolved: 2025-08-04

## 2025-08-04 PROBLEM — M25.511 CHRONIC RIGHT SHOULDER PAIN: Status: RESOLVED | Noted: 2025-06-20 | Resolved: 2025-08-04

## 2025-08-28 ENCOUNTER — PATIENT MESSAGE (OUTPATIENT)
Dept: ADMINISTRATIVE | Facility: HOSPITAL | Age: 55
End: 2025-08-28
Payer: COMMERCIAL